# Patient Record
Sex: FEMALE | Race: WHITE | HISPANIC OR LATINO | Employment: FULL TIME | URBAN - METROPOLITAN AREA
[De-identification: names, ages, dates, MRNs, and addresses within clinical notes are randomized per-mention and may not be internally consistent; named-entity substitution may affect disease eponyms.]

---

## 2021-10-05 ENCOUNTER — OFFICE VISIT (OUTPATIENT)
Dept: OBGYN CLINIC | Facility: CLINIC | Age: 36
End: 2021-10-05

## 2021-10-05 VITALS
HEIGHT: 63 IN | HEART RATE: 80 BPM | WEIGHT: 147.6 LBS | SYSTOLIC BLOOD PRESSURE: 120 MMHG | BODY MASS INDEX: 26.15 KG/M2 | DIASTOLIC BLOOD PRESSURE: 81 MMHG

## 2021-10-05 DIAGNOSIS — N92.0 MENORRHAGIA WITH REGULAR CYCLE: ICD-10-CM

## 2021-10-05 DIAGNOSIS — Z12.4 CERVICAL CANCER SCREENING: ICD-10-CM

## 2021-10-05 DIAGNOSIS — Z12.39 ENCOUNTER FOR BREAST CANCER SCREENING USING NON-MAMMOGRAM MODALITY: ICD-10-CM

## 2021-10-05 DIAGNOSIS — Z01.419 WOMEN'S ANNUAL ROUTINE GYNECOLOGICAL EXAMINATION: Primary | ICD-10-CM

## 2021-10-05 PROCEDURE — G0145 SCR C/V CYTO,THINLAYER,RESCR: HCPCS | Performed by: NURSE PRACTITIONER

## 2021-10-05 PROCEDURE — G0476 HPV COMBO ASSAY CA SCREEN: HCPCS | Performed by: NURSE PRACTITIONER

## 2021-10-05 PROCEDURE — 99385 PREV VISIT NEW AGE 18-39: CPT | Performed by: NURSE PRACTITIONER

## 2021-10-05 PROCEDURE — 0503F POSTPARTUM CARE VISIT: CPT | Performed by: NURSE PRACTITIONER

## 2021-10-06 ENCOUNTER — HOSPITAL ENCOUNTER (OUTPATIENT)
Dept: ULTRASOUND IMAGING | Facility: HOSPITAL | Age: 36
Discharge: HOME | End: 2021-10-06
Payer: COMMERCIAL

## 2021-10-06 DIAGNOSIS — N92.0 MENORRHAGIA WITH REGULAR CYCLE: ICD-10-CM

## 2021-10-06 PROCEDURE — 76856 US EXAM PELVIC COMPLETE: CPT

## 2021-10-06 PROCEDURE — 76830 TRANSVAGINAL US NON-OB: CPT

## 2021-10-07 LAB
HPV HR 12 DNA CVX QL NAA+PROBE: NEGATIVE
HPV16 DNA CVX QL NAA+PROBE: NEGATIVE
HPV18 DNA CVX QL NAA+PROBE: NEGATIVE

## 2021-10-08 LAB
LAB AP GYN PRIMARY INTERPRETATION: NORMAL
Lab: NORMAL

## 2021-11-30 ENCOUNTER — HOSPITAL ENCOUNTER (EMERGENCY)
Facility: HOSPITAL | Age: 36
Discharge: HOME | End: 2021-11-30
Payer: COMMERCIAL

## 2021-11-30 ENCOUNTER — APPOINTMENT (EMERGENCY)
Dept: RADIOLOGY | Facility: HOSPITAL | Age: 36
End: 2021-11-30
Payer: COMMERCIAL

## 2021-11-30 VITALS
RESPIRATION RATE: 18 BRPM | HEIGHT: 62 IN | DIASTOLIC BLOOD PRESSURE: 71 MMHG | SYSTOLIC BLOOD PRESSURE: 114 MMHG | WEIGHT: 140 LBS | BODY MASS INDEX: 25.76 KG/M2 | TEMPERATURE: 97.6 F | OXYGEN SATURATION: 100 % | HEART RATE: 80 BPM

## 2021-11-30 DIAGNOSIS — S40.022A CONTUSION OF LEFT UPPER EXTREMITY, INITIAL ENCOUNTER: Primary | ICD-10-CM

## 2021-11-30 DIAGNOSIS — M25.569 KNEE PAIN: ICD-10-CM

## 2021-11-30 DIAGNOSIS — T07.XXXA ABRASIONS OF MULTIPLE SITES: ICD-10-CM
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
## 2021-11-30 PROCEDURE — 73130 X-RAY EXAM OF HAND: CPT

## 2021-11-30 PROCEDURE — 73060 X-RAY EXAM OF HUMERUS: CPT

## 2021-11-30 PROCEDURE — 99284 EMERGENCY DEPT VISIT MOD MDM: CPT

## 2021-11-30 PROCEDURE — 90715 TDAP VACCINE 7 YRS/> IM: CPT | Performed by: PHYSICIAN ASSISTANT

## 2021-11-30 PROCEDURE — 90471 IMMUNIZATION ADMIN: CPT

## 2021-11-30 PROCEDURE — 73090 X-RAY EXAM OF FOREARM: CPT

## 2021-11-30 PROCEDURE — 99284 EMERGENCY DEPT VISIT MOD MDM: CPT | Performed by: PHYSICIAN ASSISTANT

## 2021-11-30 RX ORDER — IBUPROFEN 600 MG/1
600 TABLET ORAL EVERY 6 HOURS PRN
Qty: 20 TABLET | Refills: 0 | Status: SHIPPED | OUTPATIENT
Start: 2021-11-30

## 2021-11-30 RX ORDER — IBUPROFEN 600 MG/1
600 TABLET ORAL ONCE
Status: COMPLETED | OUTPATIENT
Start: 2021-11-30 | End: 2021-11-30

## 2021-11-30 RX ADMIN — TETANUS TOXOID, REDUCED DIPHTHERIA TOXOID AND ACELLULAR PERTUSSIS VACCINE, ADSORBED 0.5 ML: 5; 2.5; 8; 8; 2.5 SUSPENSION INTRAMUSCULAR at 17:44

## 2021-11-30 RX ADMIN — IBUPROFEN 600 MG: 600 TABLET, FILM COATED ORAL at 17:43

## 2022-01-16 ENCOUNTER — NURSE TRIAGE (OUTPATIENT)
Dept: OTHER | Facility: OTHER | Age: 37
End: 2022-01-16

## 2022-01-16 DIAGNOSIS — Z11.59 SPECIAL SCREENING EXAMINATION FOR VIRAL DISEASE: Primary | ICD-10-CM

## 2022-01-16 PROCEDURE — U0003 INFECTIOUS AGENT DETECTION BY NUCLEIC ACID (DNA OR RNA); SEVERE ACUTE RESPIRATORY SYNDROME CORONAVIRUS 2 (SARS-COV-2) (CORONAVIRUS DISEASE [COVID-19]), AMPLIFIED PROBE TECHNIQUE, MAKING USE OF HIGH THROUGHPUT TECHNOLOGIES AS DESCRIBED BY CMS-2020-01-R: HCPCS | Performed by: FAMILY MEDICINE

## 2022-01-16 PROCEDURE — U0005 INFEC AGEN DETEC AMPLI PROBE: HCPCS | Performed by: FAMILY MEDICINE

## 2022-01-16 NOTE — TELEPHONE ENCOUNTER
Regarding: covid-symptomatic  ----- Message from [facility] sent at 1/16/2022  1:58 PM EST -----  "I would like to get tested  Elbert Neighbours been having symptoms "   1  Were you within 6 feet or less, for up to 15 minutes or more with a person that has a confirmed COVID-19 test? no  2  What was the date of your exposure? Unknown   3  Are you experiencing any symptoms attributed to the virus?  (Assess for SOB, cough, fever, difficulty breathing) body pain, headaches and coughing   4  HIGH RISK: Do you have any history heart or lung conditions, weakened immune system, diabetes, Asthma, CHF, HIV, COPD, Chemo, renal failure, sickle cell, etc? no  5  PREGNANCY: Are you pregnant or did you recently give birth? no  6   VACCINE: "Have you gotten the COVID-19 vaccine?" If Yes ask: "Which one, how many shots, when did you get it?" yes not sure

## 2022-01-16 NOTE — TELEPHONE ENCOUNTER
Reason for Disposition   [1] COVID-19 infection suspected by caller or triager AND [2] mild symptoms (cough, fever, or others) AND [3] has not gotten tested yet    Protocols used: CORONAVIRUS (COVID-19) [facility]AGNOSED [facility]PECTED[facility]

## 2022-01-17 ENCOUNTER — TELEPHONE (OUTPATIENT)
Dept: OTHER | Facility: OTHER | Age: 37
End: 2022-01-17

## 2022-01-17 NOTE — TELEPHONE ENCOUNTER
Your test for the novel coronavirus, also known as COVID-19, was positive  The sample showed that the virus was present  Positive COVID-19 test results are reportable to the [place] Department of Health  You may receive a call from trained public health staff to conduct an interview  It is important to answer their call  They will ask you to verify who you are  During the call they will ask you about what symptoms you have, what you did before you got sick, and who you were close to while sick  The health department does this to make sure everyone stays healthy and to reduce the spread of the virus  If you would like to verify if the caller does in fact work in contact tracing, call the [address] [place] [address] at [email or web address] ([phone])  For additional information, please visit the [facility]  website: [email or web address]     If you have any additional questions, we can schedule a virtual visit for you with a provider or call the [facility] hotline [phone], option 7, for care advice    For additional information, please visit the Coronavirus FAQ on the [facility] home page ([email or web address])

## 2023-01-03 ENCOUNTER — APPOINTMENT (EMERGENCY)
Dept: CT IMAGING | Facility: HOSPITAL | Age: 38
End: 2023-01-03

## 2023-01-03 ENCOUNTER — HOSPITAL ENCOUNTER (EMERGENCY)
Facility: HOSPITAL | Age: 38
Discharge: HOME | End: 2023-01-03
Attending: EMERGENCY MEDICINE

## 2023-01-03 ENCOUNTER — APPOINTMENT (EMERGENCY)
Dept: RADIOLOGY | Facility: HOSPITAL | Age: 38
End: 2023-01-03

## 2023-01-03 VITALS
DIASTOLIC BLOOD PRESSURE: 67 MMHG | TEMPERATURE: 98.2 F | HEART RATE: 68 BPM | OXYGEN SATURATION: 100 % | RESPIRATION RATE: 16 BRPM | SYSTOLIC BLOOD PRESSURE: 115 MMHG

## 2023-01-03 DIAGNOSIS — R07.89 CHEST DISCOMFORT: ICD-10-CM

## 2023-01-03 DIAGNOSIS — R51.9 HEADACHE: Primary | ICD-10-CM

## 2023-01-03 DIAGNOSIS — R06.02 SOB (SHORTNESS OF BREATH): ICD-10-CM

## 2023-01-03 DIAGNOSIS — R42 DIZZINESS: ICD-10-CM

## 2023-01-03 LAB
ALBUMIN SERPL BCP-MCNC: 4 G/DL (ref 3.5–5)
ALP SERPL-CCNC: 70 U/L (ref 34–104)
ALT SERPL W P-5'-P-CCNC: 20 U/L (ref 7–52)
ANION GAP SERPL CALCULATED.3IONS-SCNC: 4 MMOL/L (ref 4–13)
AST SERPL W P-5'-P-CCNC: 16 U/L (ref 13–39)
BASOPHILS # BLD AUTO: 0.04 THOUSANDS/ÂΜL (ref 0–0.1)
BASOPHILS NFR BLD AUTO: 1 % (ref 0–1)
BILIRUB SERPL-MCNC: 0.28 MG/DL (ref 0.2–1)
BUN SERPL-MCNC: 13 MG/DL (ref 5–25)
CALCIUM SERPL-MCNC: 9.3 MG/DL (ref 8.4–10.2)
CARDIAC TROPONIN I PNL SERPL HS: <2 NG/L
CHLORIDE SERPL-SCNC: 105 MMOL/L (ref 96–108)
CO2 SERPL-SCNC: 27 MMOL/L (ref 21–32)
CREAT SERPL-MCNC: 0.8 MG/DL (ref 0.6–1.3)
EOSINOPHIL # BLD AUTO: 0.13 THOUSAND/ÂΜL (ref 0–0.61)
EOSINOPHIL NFR BLD AUTO: 2 % (ref 0–6)
ERYTHROCYTE [DISTWIDTH] IN BLOOD BY AUTOMATED COUNT: 14.6 % (ref 11.6–15.1)
FLUAV RNA RESP QL NAA+PROBE: NEGATIVE
FLUBV RNA RESP QL NAA+PROBE: NEGATIVE
GFR SERPL CREATININE-BSD FRML MDRD: 93 ML/MIN/1.73SQ M
GLUCOSE SERPL-MCNC: 98 MG/DL (ref 65–140)
HCT VFR BLD AUTO: 35.8 % (ref 34.8–46.1)
HGB BLD-MCNC: 11.3 G/DL (ref 11.5–15.4)
IMM GRANULOCYTES # BLD AUTO: 0.03 THOUSAND/UL (ref 0–0.2)
IMM GRANULOCYTES NFR BLD AUTO: 1 % (ref 0–2)
LYMPHOCYTES # BLD AUTO: 2.78 THOUSANDS/ÂΜL (ref 0.6–4.47)
LYMPHOCYTES NFR BLD AUTO: 43 % (ref 14–44)
MCH RBC QN AUTO: 26.6 PG (ref 26.8–34.3)
MCHC RBC AUTO-ENTMCNC: 31.6 G/DL (ref 31.4–37.4)
MCV RBC AUTO: 84 FL (ref 82–98)
MONOCYTES # BLD AUTO: 0.44 THOUSAND/ÂΜL (ref 0.17–1.22)
MONOCYTES NFR BLD AUTO: 7 % (ref 4–12)
NEUTROPHILS # BLD AUTO: 3.02 THOUSANDS/ÂΜL (ref 1.85–7.62)
NEUTS SEG NFR BLD AUTO: 46 % (ref 43–75)
NRBC BLD AUTO-RTO: 0 /100 WBCS
PLATELET # BLD AUTO: 353 THOUSANDS/UL (ref 149–390)
PMV BLD AUTO: 9.4 FL (ref 8.9–12.7)
POTASSIUM SERPL-SCNC: 3.9 MMOL/L (ref 3.5–5.3)
PROT SERPL-MCNC: 7.1 G/DL (ref 6.4–8.4)
RBC # BLD AUTO: 4.25 MILLION/UL (ref 3.81–5.12)
RSV RNA RESP QL NAA+PROBE: NEGATIVE
SARS-COV-2 RNA RESP QL NAA+PROBE: NEGATIVE
SODIUM SERPL-SCNC: 136 MMOL/L (ref 135–147)
WBC # BLD AUTO: 6.44 THOUSAND/UL (ref 4.31–10.16)

## 2023-01-03 RX ORDER — MAGNESIUM SULFATE HEPTAHYDRATE 40 MG/ML
2 INJECTION, SOLUTION INTRAVENOUS ONCE
Status: COMPLETED | OUTPATIENT
Start: 2023-01-03 | End: 2023-01-03

## 2023-01-03 RX ORDER — DIPHENHYDRAMINE HYDROCHLORIDE 50 MG/ML
25 INJECTION INTRAMUSCULAR; INTRAVENOUS ONCE
Status: COMPLETED | OUTPATIENT
Start: 2023-01-03 | End: 2023-01-03

## 2023-01-03 RX ORDER — KETOROLAC TROMETHAMINE 30 MG/ML
15 INJECTION, SOLUTION INTRAMUSCULAR; INTRAVENOUS ONCE
Status: COMPLETED | OUTPATIENT
Start: 2023-01-03 | End: 2023-01-03

## 2023-01-03 RX ORDER — METOCLOPRAMIDE HYDROCHLORIDE 5 MG/ML
10 INJECTION INTRAMUSCULAR; INTRAVENOUS ONCE
Status: COMPLETED | OUTPATIENT
Start: 2023-01-03 | End: 2023-01-03

## 2023-01-03 RX ADMIN — SODIUM CHLORIDE 1000 ML: 0.9 INJECTION, SOLUTION INTRAVENOUS at 13:46

## 2023-01-03 RX ADMIN — METOCLOPRAMIDE 10 MG: 5 INJECTION, SOLUTION INTRAMUSCULAR; INTRAVENOUS at 13:47

## 2023-01-03 RX ADMIN — KETOROLAC TROMETHAMINE 15 MG: 30 INJECTION, SOLUTION INTRAMUSCULAR; INTRAVENOUS at 13:47

## 2023-01-03 RX ADMIN — DIPHENHYDRAMINE HYDROCHLORIDE 25 MG: 50 INJECTION, SOLUTION INTRAMUSCULAR; INTRAVENOUS at 13:46

## 2023-01-03 RX ADMIN — MAGNESIUM SULFATE HEPTAHYDRATE 2 G: 40 INJECTION, SOLUTION INTRAVENOUS at 14:42

## 2023-01-03 NOTE — ED ATTENDING ATTESTATION
1/3/2023  IDebbie DO, saw and evaluated the patient  I have discussed the patient with the resident/non-physician practitioner and agree with the resident's/non-physician practitioner's findings, Plan of Care, and MDM as documented in the resident's/non-physician practitioner's note, except where noted  All available labs and Radiology studies were reviewed  I was present for key portions of any procedure(s) performed by the resident/non-physician practitioner and I was immediately available to provide assistance  At this point I agree with the current assessment done in the Emergency Department  I have conducted an independent evaluation of this patient a history and physical is as follows:    15-year-old female coming into the ED for evaluation of a several day history of posterior headache, described as pressure, with some associated lightheadedness, chest tightness and shortness of breath  No sinus congestion  No fevers or chills  No leg swelling  No history of migraines  She states she does not get headaches like this frequently  PE:  The patient is well appearing, non-toxic, in NAD  Head: normocephalic, atraumatic  HEENT: mucous membranes moist   No sinus tenderness lungs: CTA b/l, no resp distress  Heart: RRR  No M/R/G  Abdomen: NT, ND, no R/R/G  Neuro: CN2-12 intact, GCS 15  Normal strength and sensation, normal speech and gait  Cap refill < 2 sec, skin warm and dry  No rashes or lesions  Workup and exam unremarkable  Symptoms resolved w/ migraine meds  Stable for d/c home, dx headache, migraine         ED Course         Critical Care Time  Procedures

## 2023-01-03 NOTE — DISCHARGE INSTRUCTIONS
Call and schedule follow-up appointment with your primary care doctor  You can take ibuprofen or Tylenol for any recurrence in your headache  Return to the emergency department should you have worsening or severe headache, worsening dizziness, worsening chest pain or shortness of breath/difficulty breathing, or any other concerning symptoms

## 2023-01-03 NOTE — ED PROVIDER NOTES
History  Chief Complaint   Patient presents with   • Dizziness     Pt c/o dizziness and an occipital headache for the past couple days, no hx of migraines  Pt describes the headache as pressure in the back of her head, but no further neuro deficits noted in triage  - n/v/d     Sangita Franco is a 42year old female with a history of migraines presenting for evaluation of several days of occipital headache, described as a pressure in the back of her head, with associated lightheadedness, chest pain and shortness of breath  Patient denies any cough or congestion, no known sick contacts  She says that the headache is different than previous headaches in location and sensation  She denies any neurological deficits at this time, gait is intact  She does not have any significant cardiopulmonary history, no history of DVT or PE, no recent surgeries, no active cancer, she does not take any oral estrogen, no recent lower extremity swelling or tenderness  Patient has not taken anything for her pain and discomfort  History provided by:  Patient   used: No    Dizziness  Associated symptoms: chest pain, headaches and shortness of breath    Associated symptoms: no palpitations and no vomiting        Prior to Admission Medications   Prescriptions Last Dose Informant Patient Reported? Taking?   ibuprofen (MOTRIN) 600 mg tablet   No No   Sig: Take 1 tablet (600 mg total) by mouth every 6 (six) hours as needed for mild pain      Facility-Administered Medications: None       Past Medical History:   Diagnosis Date   • Kidney stones        Past Surgical History:   Procedure Laterality Date   • TUBAL LIGATION         Family History   Problem Relation Age of Onset   • Breast cancer Paternal Aunt    • Colon cancer Neg Hx    • Ovarian cancer Neg Hx    • Diabetes Neg Hx      I have reviewed and agree with the history as documented      E-Cigarette/Vaping   • E-Cigarette Use Never User      E-Cigarette/Vaping Substances Social History     Tobacco Use   • Smoking status: Never   • Smokeless tobacco: Never   Vaping Use   • Vaping Use: Never used   Substance Use Topics   • Alcohol use: Never   • Drug use: Never        Review of Systems   Constitutional: Negative for chills and fever  HENT: Negative for ear pain and sore throat  Eyes: Negative for pain and visual disturbance  Respiratory: Positive for shortness of breath  Negative for cough  Cardiovascular: Positive for chest pain  Negative for palpitations  Gastrointestinal: Negative for abdominal pain and vomiting  Genitourinary: Negative for dysuria and hematuria  Musculoskeletal: Negative for arthralgias and back pain  Skin: Negative for color change and rash  Neurological: Positive for dizziness and headaches  Negative for seizures and syncope  All other systems reviewed and are negative  Physical Exam  ED Triage Vitals   Temperature Pulse Respirations Blood Pressure SpO2   01/03/23 1050 01/03/23 1050 01/03/23 1050 01/03/23 1050 01/03/23 1050   98 2 °F (36 8 °C) 69 18 135/66 99 %      Temp Source Heart Rate Source Patient Position - Orthostatic VS BP Location FiO2 (%)   01/03/23 1050 01/03/23 1050 01/03/23 1251 01/03/23 1050 --   Oral Monitor Sitting Right arm       Pain Score       01/03/23 1251       No Pain             Orthostatic Vital Signs  Vitals:    01/03/23 1251 01/03/23 1253 01/03/23 1445 01/03/23 1500   BP:  164/72 126/66 115/67   Pulse:  76 68 68   Patient Position - Orthostatic VS: Sitting Sitting Lying Lying       Physical Exam  Vitals and nursing note reviewed  Constitutional:       Appearance: She is ill-appearing  HENT:      Head: Normocephalic and atraumatic  Mouth/Throat:      Mouth: Mucous membranes are moist       Pharynx: Oropharynx is clear  Eyes:      General: No scleral icterus  Extraocular Movements: Extraocular movements intact        Conjunctiva/sclera: Conjunctivae normal       Pupils: Pupils are equal, round, and reactive to light  Cardiovascular:      Rate and Rhythm: Normal rate and regular rhythm  Heart sounds: Normal heart sounds  Pulmonary:      Effort: Pulmonary effort is normal  No respiratory distress  Breath sounds: Normal breath sounds  No wheezing, rhonchi or rales  Abdominal:      General: Abdomen is flat  There is no distension  Palpations: Abdomen is soft  Tenderness: There is no abdominal tenderness  Musculoskeletal:         General: No tenderness or signs of injury  Cervical back: Neck supple  No rigidity  Right lower leg: No edema  Left lower leg: No edema  Skin:     General: Skin is warm  Coloration: Skin is not jaundiced  Findings: No erythema or rash  Neurological:      General: No focal deficit present  Mental Status: She is alert and oriented to person, place, and time  Mental status is at baseline  GCS: GCS eye subscore is 4  GCS verbal subscore is 5  GCS motor subscore is 6  Cranial Nerves: No cranial nerve deficit  Sensory: No sensory deficit  Motor: No weakness  Coordination: Coordination normal  Finger-Nose-Finger Test and Heel to [facility] Test normal       Gait: Gait is intact     Psychiatric:         Mood and Affect: Mood normal          Behavior: Behavior normal          ED Medications  Medications   sodium chloride 0 9 % bolus 1,000 mL (1,000 mL Intravenous New Bag 1/3/23 1346)   diphenhydrAMINE (BENADRYL) injection 25 mg (25 mg Intravenous Given 1/3/23 1346)   metoclopramide (REGLAN) injection 10 mg (10 mg Intravenous Given 1/3/23 1347)   ketorolac (TORADOL) injection 15 mg (15 mg Intravenous Given 1/3/23 1347)   magnesium sulfate 2 g/50 mL IVPB (premix) 2 g (2 g Intravenous New Bag 1/3/23 1442)       Diagnostic Studies  Results Reviewed     Procedure Component Value Units Date/Time    FLU/RSV/COVID - if FLU/RSV clinically relevant [[id number]]  (Normal) Collected: 01/03/23    Lab Status: Final result Specimen: Nares from Nose Updated: 01/03/23 1505     SARS-CoV-2 Negative     INFLUENZA A PCR Negative     INFLUENZA B PCR Negative     RSV PCR Negative    Narrative:      FOR PEDIATRIC PATIENTS - copy/paste COVID Guidelines URL to browser: [email or web address]    SARS-CoV-2 assay is a Nucleic Acid Amplification assay intended for the  qualitative detection of nucleic acid from SARS-CoV-2 in nasopharyngeal  swabs  Results are for the presumptive identification of SARS-CoV-2 RNA  Positive results are indicative of infection with SARS-CoV-2, the virus  causing COVID-19, but do not rule out bacterial infection or co-infection  with other viruses  Laboratories within the [place] and its  territories are required to report all positive results to the appropriate  public health authorities  Negative results do not preclude SARS-CoV-2  infection and should not be used as the sole basis for treatment or other  patient management decisions  Negative results must be combined with  clinical observations, patient history, and epidemiological information  This test has not been FDA cleared or approved  This test has been authorized by FDA under an Emergency Use Authorization  (EUA)  This test is only authorized for the duration of time the  declaration that circumstances exist justifying the authorization of the  emergency use of an in vitro diagnostic tests for detection of SARS-CoV-2  virus and/or diagnosis of COVID-19 infection under section 564(b)(1) of  the Act, 21 U  S C  824NJB-6(V)(4), unless the authorization is terminated  or revoked sooner  The test has been validated but independent review by FDA  and CLIA is pending  Test performed using [employer] GeneXpert: This RT-PCR assay targets N2,  a region unique to SARS-CoV-2  A conserved region in the E-gene was chosen  for pan-Sarbecovirus detection which includes SARS-CoV-2      According to CMS-2020-01-R, this platform meets the definition of high-throughput technology      POCT pregnancy, urine [[id number]]     Lab Status: No result Specimen: Urine     HS Troponin 0hr (reflex protocol) [[id number]]  (Normal) Collected: 01/03/23 1057    Lab Status: Final result Specimen: Blood from Arm, Left Updated: 01/03/23 1144     hs TnI 0hr <2 ng/L     Comprehensive metabolic panel [[id number]] Collected: 01/03/23 1057    Lab Status: Final result Specimen: Blood from Arm, Left Updated: 01/03/23 1128     Sodium 136 mmol/L      Potassium 3 9 mmol/L      Chloride 105 mmol/L      CO2 27 mmol/L      ANION GAP 4 mmol/L      BUN 13 mg/dL      Creatinine 0 80 mg/dL      Glucose 98 mg/dL      Calcium 9 3 mg/dL      AST 16 U/L      ALT 20 U/L      Alkaline Phosphatase 70 U/L      Total Protein 7 1 g/dL      Albumin 4 0 g/dL      Total Bilirubin 0 28 mg/dL      eGFR 93 ml/min/1 73sq m     Narrative:      National Kidney Disease Foundation guidelines for Chronic Kidney Disease (CKD):   •  Stage 1 with normal or high GFR (GFR > 90 mL/min/1 73 square meters)  •  Stage 2 Mild CKD (GFR = 60-89 mL/min/1 73 square meters)  •  Stage 3A Moderate CKD (GFR = 45-59 mL/min/1 73 square meters)  •  Stage 3B Moderate CKD (GFR = 30-44 mL/min/1 73 square meters)  •  Stage 4 Severe CKD (GFR = 15-29 mL/min/1 73 square meters)  •  Stage 5 End Stage CKD (GFR <15 mL/min/1 73 square meters)  Note: GFR calculation is accurate only with a steady state creatinine    CBC and differential [[id number]]  (Abnormal) Collected: 01/03/23 1057    Lab Status: Final result Specimen: Blood from Arm, Left Updated: 01/03/23 1120     WBC 6 44 Thousand/uL      RBC 4 25 Million/uL      Hemoglobin 11 3 g/dL      Hematocrit 35 8 %      MCV 84 fL      MCH 26 6 pg      MCHC 31 6 g/dL      RDW 14 6 %      MPV 9 4 fL      Platelets 929 Thousands/uL      nRBC 0 /100 WBCs      Neutrophils Relative 46 %      Immat GRANS % 1 %      Lymphocytes Relative 43 %      Monocytes Relative 7 % Eosinophils Relative 2 %      Basophils Relative 1 %      Neutrophils Absolute 3 02 Thousands/µL      Immature Grans Absolute 0 03 Thousand/uL      Lymphocytes Absolute 2 78 Thousands/µL      Monocytes Absolute 0 44 Thousand/µL      Eosinophils Absolute 0 13 Thousand/µL      Basophils Absolute 0 04 Thousands/µL                  CT head without contrast   Final Result by Onelia Jamison MD (01/03 1459)      Normal examination                    Workstation performed: [id number]         XR chest 1 view portable   ED Interpretation by [id number],  (01/03 1529)   No acute cardiopulmonary abnormalities visualized            Procedures  Procedures      ED Course                     PERC Rule for PE    Flowsheet Row Most Recent Value   PERC Rule for PE    Age >=50 0 Filed at: 01/03/2023 1344   HR >=100 0 Filed at: 01/03/2023 1344   O2 Sat on room air < 95% 0 Filed at: 01/03/2023 1344   History of PE or DVT 0 Filed at: 01/03/2023 1344   Recent trauma or surgery 0 Filed at: 01/03/2023 1344   Hemoptysis 0 Filed at: 01/03/2023 1344   Exogenous estrogen 0 Filed at: 01/03/2023 1344   Unilateral leg swelling 0 Filed at: 01/03/2023 1344   PERC Rule for PE Results 0 Filed at: 01/03/2023 1344                  Wells' Criteria for PE    Flowsheet Row Most Recent Value   Wells' Criteria for PE    Clinical signs and symptoms of DVT 0 Filed at: 01/03/2023 1344   PE is primary diagnosis or equally likely 0 Filed at: 01/03/2023 1344   HR >100 0 Filed at: 01/03/2023 1344   Immobilization at least 3 days or Surgery in the previous 4 weeks 0 Filed at: 01/03/2023 1344   Previous, objectively diagnosed PE or DVT 0 Filed at: 01/03/2023 1344   Hemoptysis 0 Filed at: 01/03/2023 1344   Malignancy with treatment within 6 months or palliative 0 Filed at: 01/03/2023 1344   Wells' Criteria Total 0 Filed at: 01/03/2023 Emanuel Aguilera is a 42year old female with a history of migraines presenting for evaluation of several days of occipital headache, described as a pressure in the back of her head, with associated lightheadedness, chest pain and shortness of breath  Patient denied any neurological symptoms  Patient did not have any significant cardiac history, no history of DVT or PE, no risk factors as well for PE, PERC of 0  Had concern for possible tension headache, migraine headache, possible flu or COVID infection  Low suspicion for acute intracranial abnormality, however patient did describe that her headache is not like her typical migraine  Neurological exam was unremarkable  Rest of physiological exam is also unremarkable  CT of her head was negative for any acute intracranial abnormalities  Blood work was unremarkable and COVID/flu was negative  Patient was given a migraine cocktail with significant relief of her symptoms  I gave her strict return precautions and advised her to follow-up with her primary care doctor, she was agreeable to plan  Chest discomfort: acute illness or injury  Dizziness: acute illness or injury  Headache: acute illness or injury  SOB (shortness of breath): acute illness or injury  Amount and/or Complexity of Data Reviewed  Labs: ordered  Radiology: ordered and independent interpretation performed  Risk  Prescription drug management            Disposition  Final diagnoses:   Headache   Dizziness   Chest discomfort   SOB (shortness of breath)     Time reflects when diagnosis was documented in both MDM as applicable and the Disposition within this note     Time User Action Codes Description Comment    1/3/2023  3:37 PM Loy Fuelling [R51 9] Headache     1/3/2023  3:37 PM Juan Francisco Young Add [R42] Dizziness     1/3/2023  3:37 PM Juan Francisco Young Add [R07 89] Chest discomfort     1/3/2023  3:37 PM Juan Francisco Young Add [R06 02] SOB (shortness of breath)       ED Disposition     ED Disposition   Discharge    Condition   Stable    Date/Time Tue Michele 3, 2023  3:37 PM    [address] discharge to home/self care  Follow-up Information    None         Patient's Medications   Discharge Prescriptions    No medications on file     No discharge procedures on file  PDMP Review     None           ED Provider  Attending physically available and evaluated Annette Rehman I managed the patient along with the ED Attending      Electronically Signed by         Clement Agustin DO  01/03/23 8033

## 2023-03-09 ENCOUNTER — OFFICE VISIT (OUTPATIENT)
Dept: FAMILY MEDICINE CLINIC | Facility: CLINIC | Age: 38
End: 2023-03-09

## 2023-03-09 VITALS
OXYGEN SATURATION: 99 % | DIASTOLIC BLOOD PRESSURE: 76 MMHG | SYSTOLIC BLOOD PRESSURE: 112 MMHG | HEART RATE: 78 BPM | HEIGHT: 62 IN | TEMPERATURE: 98.3 F | WEIGHT: 134 LBS | BODY MASS INDEX: 24.66 KG/M2

## 2023-03-09 DIAGNOSIS — F33.0 MILD EPISODE OF RECURRENT MAJOR DEPRESSIVE DISORDER (HCC): ICD-10-CM

## 2023-03-09 DIAGNOSIS — R53.83 OTHER FATIGUE: Primary | ICD-10-CM

## 2023-03-09 RX ORDER — BUPROPION HYDROCHLORIDE 150 MG/1
150 TABLET ORAL EVERY MORNING
Qty: 30 TABLET | Refills: 5 | Status: SHIPPED | OUTPATIENT
Start: 2023-03-09 | End: 2023-09-05

## 2023-03-09 NOTE — PROGRESS NOTES
Michele Riley was seen today for establish care  Diagnoses and all orders for this visit:    Other fatigue  -     TSH, 3rd generation with Free T4 reflex; Future  -     CBC and Platelet; Future  -     Iron Panel (Includes Ferritin, Iron Sat%, Iron, and TIBC); Future  -     Vitamin D 25 hydroxy; Future  -     buPROPion (WELLBUTRIN XL) 150 mg 24 hr tablet; Take 1 tablet (150 mg total) by mouth every morning    Mild episode of recurrent major depressive disorder (HCC)  -     TSH, 3rd generation with Free T4 reflex; Future  -     Vitamin D 25 hydroxy; Future  -     buPROPion (WELLBUTRIN XL) 150 mg 24 hr tablet; Take 1 tablet (150 mg total) by mouth every morning        PMHx:  Nephrolithasis  History of depression  Eczema in childhood    Acute Concerns:  Dry skin on hands - duration of few months since winter began  Attempted lotion which helped somewhat  Legs and feet numb when sleeping - duration of few months  Feels restless during these times and feel need to move legs  Visual problem - Endorses blurry vision  Near objects are blurry when wearing glasses  Denies eye pain, eye drainage, tearing  Last saw optometrist last year  Does not wear contacts  Menorrhagia - Heavier menstrual cycles for few years duration with clots, possibly occurring after tubal ligation procedure  Last pap smear normal  No vaginal discharge or unusual odor  Medications:  None    Allergies:  Gluten    Hospitalizations:  ED visit 1/3/2023 for headaches - given migraine cocktail for symptomatic relief  Instructed to follow-up with PCP  Head CT negative for acute abnormalities    Surgeries:  Tubal ligation    Psych Hx:  Depression  Anxiety      Social Hx:  • Smokes? No  • Drinks? Yes  o What type of alcohol? Beer  o How many drinks per episode? One third of handle of liquor (over holidays)  o How many drinks per week? 0 on average  • Illicit drug use? 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 No  • Partners - men, women, or both? Men  • STD Hx: One prior episode during adolescence  Unsure of exact STI  • Contraceptive use: No         Menstrual Hx:  • Menarche: 11  • LMP: 2/13/2023  • Menstrual cycle length (28-32 days on average): monthly   • Bleeding days: 2-3 days          Family Hx:  • Cancer? Paternal grandmother - cervical cancer  Paternal aunt - breast cancer  • Stroke? No  • MI? No  • HTN? No  • HLD? No      ROS:  Review of Systems   Constitutional: Negative for chills and fever  HENT: Negative for sinus pain and sore throat  Respiratory: Positive for chest tightness  Negative for shortness of breath  Cardiovascular: Negative for chest pain  Gastrointestinal: Negative for abdominal pain, diarrhea and vomiting  Genitourinary: Negative for flank pain  Skin: Negative for rash  Neurological: Positive for numbness  Physical Exam  Vitals and nursing note reviewed  Constitutional:       General: She is not in acute distress  Appearance: She is well-developed  HENT:      Head: Normocephalic and atraumatic  Eyes:      Conjunctiva/sclera: Conjunctivae normal    Cardiovascular:      Rate and Rhythm: Normal rate and regular rhythm  Heart sounds: No murmur heard  Pulmonary:      Effort: Pulmonary effort is normal  No respiratory distress  Breath sounds: Normal breath sounds  Abdominal:      Palpations: Abdomen is soft  Tenderness: There is no abdominal tenderness  Musculoskeletal:         General: No swelling  Cervical back: Neck supple  Skin:     General: Skin is warm and dry  Capillary Refill: Capillary refill takes less than 2 seconds  Neurological:      Mental Status: She is alert     Psychiatric:         Mood and Affect: Mood normal          Labs reviewed: CMP, CBC & Diff  Imaging reviewed: Ct head w/o contrast, XR chest

## 2023-03-09 NOTE — PATIENT INSTRUCTIONS
[facility]  com for marriage counseling    La depresión   LO QUE NECESITA SABER:   La depresión es un trastorno médico que causa sentimientos de tristeza o desesperanza que no desaparecen  La depresión puede causar que usted pierda interés en las cosas que antes disfrutaba  Estos sentimientos pueden llegar a interferir con hayward beatris diaria  INSTRUCCIONES SOBRE EL [facility]ITAL[facility]:   Llame al [facility] [place] de emergencias local (911 en los [place]) si:  Kijet Wong en lastimarse o lastimar a alguien más  Usted ha hecho algo a propósito para hacerse daño  Llame a hayward terapeuta o médico si:  Dena síntomas no mejoran  No puede asistir a hayward próxima adela  Usted tiene [employer]  Usted tiene preguntas o inquietudes acerca de hayward condición o cuidado  Los siguientes recursos están disponibles en cualquier momento para ayudarlo, si es necesario:  Comuníquese con roberth organización de prevención del suicidio:       Para la 988 Suicide and Crisis Lifeline (línea de beatris 988 contra el suicidio y la crisis):     Llame o envíe un mensaje de texto al 988     Envíe un mensaje de chat en [email or web address] org/chat     Llame al [phone] ([phone])    Para la Suicide Hotline (línea de atención al suicida), llame al 2-729-956-805-505-8151 (8-930-EWCSVXM)    Para obtener roberth lista de números internacionales: https://save org/find-help/international-resources/  Medicamentos:  Antidepresivos pueden darse para mejorar o balancear hayward estado de ánimo  Es posible que usted necesite terri laurie medicamento por varias semanas antes de empezar a sentirse mejor  [place] dena medicamentos elizabeth se le haya indicado  Consulte con hayward médico si usted jasmin que hayward medicamento no le está ayudando o si presenta efectos secundarios  Infórmele al médico si usted es alérgico a algún medicamento  Mantenga roberth lista actualizada de los [place], las vitaminas y los productos herbales que mushtaq   Incluya los [employer] medicamentos: cantidad, frecuencia y motivo de administración  Traiga con usted la lista o los envases de las píldoras a alverto citas de seguimiento  Lleve la lista de los medicamentos con usted en mio de roberth emergencia  La terapia se utiliza a menudo junto con medicamentos para aliviar la depresión  La terapia es un lugar para hablar sobre alverto sentimientos y todo lo que puede estar causando la depresión  La terapia se puede realizar alicia o en jessica  También podría realizarse con alverto familiares o con hayward ann  Cuidados personales:  Realice actividad física regularmente  Trate de mantenerse activo por 30 minutos, de 3 a 5 días a la semana  La actividad física puede ayudar a aliviar la depresión  Colabore con hayward médico para crear un plan de ejercicios que usted disfrute  Puede ayudarlo si le pide a alguien que se mantenga activo con usted  Establezca un horario regular para dormir  Adriana Conn puede ayudarlo a relajarse antes de irse a dormir  Escuche música, ish o dago yoga  Trate de irse a dormir y despertarse al mismo tiempo todos los stephanie  El sueño es importante para la valentina Sharyn Ege saludables y variados  Los alimentos saludables incluyen frutas, verduras, panes integrales, productos lácteos descremados, santos magras, pescado y fríjoles  Un plan alimenticio saludable es bajo en grasas, sal y azúcar adicional     No tome alcohol ni use drogas  El alcohol y las drogas pueden empeorar la depresión  Hable con hayward terapeuta o médico si usted necesita ayuda para dejar de fumar  Acuda a alverto consultas de control con hayward médico según le indicaron: Hayward médico vigilará hayward progreso en las citas de seguimiento  También monitoreará hayward medicamento si usted está tomando antidepresivos  Hayward médico le preguntará si el [employer] está ayudando  Dígale sobre cualquier efecto secundario o problemas que usted tenga con hayward medicamento  Podría ser necesario cambiar el tipo o cantidad de [place]  Anote alverto preguntas para que se acuerde de hacerlas dariel alverto visitas  Para apoyo o más información:  [employer] on Mental Illness  [address] [facility]  , [address] [facility] , [address] [place] [address]  Phone: [phone]  Phone: [phone]  Web Address: http://Philoptima/  DATY  [address] [facility] Suicide and [address] [place] [id number]  Phone: [phone]  Web Address: PagosOnLine OR [email or web address]    © Copyright [employer] 2022 Information is for End User's use only and may not be sold, redistributed or otherwise used for commercial purposes  Esta información es sólo para uso en educación  Hayawrd intención no es darle un consejo médico sobre enfermedades o tratamientos  Colsulte con hayward Bautista Uma farmacéutico antes de seguir cualquier régimen médico para saber si es seguro y efectivo para usted

## 2023-04-25 ENCOUNTER — APPOINTMENT (OUTPATIENT)
Dept: LAB | Facility: CLINIC | Age: 38
End: 2023-04-25

## 2023-04-25 DIAGNOSIS — R53.83 OTHER FATIGUE: ICD-10-CM

## 2023-04-25 DIAGNOSIS — F33.0 MILD EPISODE OF RECURRENT MAJOR DEPRESSIVE DISORDER (HCC): ICD-10-CM

## 2023-04-25 LAB
25(OH)D3 SERPL-MCNC: 19.5 NG/ML (ref 30–100)
ERYTHROCYTE [DISTWIDTH] IN BLOOD BY AUTOMATED COUNT: 13.8 % (ref 11.6–15.1)
HCT VFR BLD AUTO: 37.7 % (ref 34.8–46.1)
HGB BLD-MCNC: 11.9 G/DL (ref 11.5–15.4)
MCH RBC QN AUTO: 27.7 PG (ref 26.8–34.3)
MCHC RBC AUTO-ENTMCNC: 31.6 G/DL (ref 31.4–37.4)
MCV RBC AUTO: 88 FL (ref 82–98)
PLATELET # BLD AUTO: 383 THOUSANDS/UL (ref 149–390)
PMV BLD AUTO: 9.9 FL (ref 8.9–12.7)
RBC # BLD AUTO: 4.3 MILLION/UL (ref 3.81–5.12)
WBC # BLD AUTO: 7.34 THOUSAND/UL (ref 4.31–10.16)

## 2023-04-26 LAB
FERRITIN SERPL-MCNC: 5 NG/ML (ref 8–388)
IRON SATN MFR SERPL: 11 % (ref 15–50)
IRON SERPL-MCNC: 42 UG/DL (ref 50–170)
TIBC SERPL-MCNC: 387 UG/DL (ref 250–450)
TSH SERPL DL<=0.05 MIU/L-ACNC: 1.67 UIU/ML (ref 0.45–4.5)

## 2023-05-08 ENCOUNTER — TELEPHONE (OUTPATIENT)
Dept: DERMATOLOGY | Facility: CLINIC | Age: 38
End: 2023-05-08

## 2023-05-08 NOTE — TELEPHONE ENCOUNTER
NP looking for apt for - extremely dry hands, our apts are end of nov for NP, pt declined apt as she has apt w/another provider at the same timeframe

## 2023-06-26 ENCOUNTER — TELEPHONE (OUTPATIENT)
Dept: OBGYN CLINIC | Facility: MEDICAL CENTER | Age: 38
End: 2023-06-26

## 2023-07-20 ENCOUNTER — OFFICE VISIT (OUTPATIENT)
Dept: FAMILY MEDICINE CLINIC | Facility: CLINIC | Age: 38
End: 2023-07-20
Payer: COMMERCIAL

## 2023-07-20 VITALS
OXYGEN SATURATION: 98 % | RESPIRATION RATE: 18 BRPM | DIASTOLIC BLOOD PRESSURE: 70 MMHG | WEIGHT: 145 LBS | TEMPERATURE: 98.3 F | SYSTOLIC BLOOD PRESSURE: 123 MMHG | HEIGHT: 62 IN | BODY MASS INDEX: 26.68 KG/M2 | HEART RATE: 79 BPM

## 2023-07-20 DIAGNOSIS — N94.10 DYSPAREUNIA IN FEMALE: Primary | ICD-10-CM

## 2023-07-20 LAB
BILIRUB UR QL STRIP: NEGATIVE
CLARITY UR: CLEAR
COLOR UR: COLORLESS
GLUCOSE UR STRIP-MCNC: NEGATIVE MG/DL
HGB UR QL STRIP.AUTO: NEGATIVE
KETONES UR STRIP-MCNC: NEGATIVE MG/DL
LEUKOCYTE ESTERASE UR QL STRIP: NEGATIVE
NITRITE UR QL STRIP: NEGATIVE
PH UR STRIP.AUTO: 7 [PH]
PROT UR STRIP-MCNC: NEGATIVE MG/DL
SP GR UR STRIP.AUTO: 1 (ref 1–1.03)
UROBILINOGEN UR STRIP-ACNC: <2 MG/DL

## 2023-07-20 PROCEDURE — 87480 CANDIDA DNA DIR PROBE: CPT | Performed by: FAMILY MEDICINE

## 2023-07-20 PROCEDURE — 87660 TRICHOMONAS VAGIN DIR PROBE: CPT | Performed by: FAMILY MEDICINE

## 2023-07-20 PROCEDURE — 87510 GARDNER VAG DNA DIR PROBE: CPT | Performed by: FAMILY MEDICINE

## 2023-07-20 PROCEDURE — 87591 N.GONORRHOEAE DNA AMP PROB: CPT | Performed by: FAMILY MEDICINE

## 2023-07-20 PROCEDURE — 99214 OFFICE O/P EST MOD 30 MIN: CPT | Performed by: FAMILY MEDICINE

## 2023-07-20 PROCEDURE — 87491 CHLMYD TRACH DNA AMP PROBE: CPT | Performed by: FAMILY MEDICINE

## 2023-07-20 PROCEDURE — 81003 URINALYSIS AUTO W/O SCOPE: CPT | Performed by: FAMILY MEDICINE

## 2023-07-20 NOTE — PROGRESS NOTES
Name: Conway Mcburney      : 1985      MRN: [id number]  Encounter Provider: Jonathan Tomas MD  Encounter Date: 2023   Encounter department: [facility]    Assessment & Plan     1. Dyspareunia in female  Assessment & Plan:  · Significant dyspareunia and chronic pelvic pain with menorrhagia   · Was following with OB/GYN previously, (+) fibroid on previous US  · No temporal association of pain with menses per patient report  · Will repeat ultrasound at this time to evaluate for fibroids/adenomyosis  · Based on patient's symptoms, high suspicion for endometriosis  · Referral given to OB/GYN for further evaluation  · Does not want to start any hormonal birth control at this time  · GC/chlamydia, AFFIRM, UA collected today to exclude infection, but low suspicion; will follow-up based on results  · UTD on Pap smear     Orders:  -     US pelvis complete non OB; Future; Expected date: 2023  -     Ambulatory Referral to Obstetrics / Gynecology; Future  -     UA w Reflex to Microscopic w Reflex to Culture - Clinic Collect  -     Chlamydia/GC amplified DNA by PCR; Future  -     VAGINOSIS DNA PROBE (AFFIRM); Future  -     Chlamydia/GC amplified DNA by PCR  -     VAGINOSIS DNA PROBE (AFFIRM)         Subjective      HPI     Patient here today regarding a chronic problem that she feels has recently gotten worse. She reports following her tubal ligation 8 years ago she began to develop intermittent pelvic pain, significant dyspareunia, and menorrhagia with regular cycles. She saw OB/GYN in 2020 who ordered pelvic ultrasound, US 10/5/2021 showed a subcentimeter anterior uterine fibroid and a resolving left ovarian cyst, and patient was advised that fibroid was likely the etiology of her heavy menses. She chose not to start any hormonal contraception at that time. Pap was normal at that time as well.  She has not followed back up with OB/GYN since then, but reports that she has continued to have this issue without much relief. Menses are currently every month, but very heavy with clots, she reports the duration has also become shorter (previously lasted 7 days, currently 2-3 days), unsure if her pelvic pain is worse around her periods. LMP was roughly 2 weeks ago. Reports significant pain with intercourse, both during intercourse with deep thrusting and after intercourse, feels pain is deep and central in her lower abdomen and pelvis, no radiation of pain, unable to truly characterize nature of pain just states that it "hurts" but is unsure whether it is sharp or dull specifically. Does report that severity is so bad she is unable to have intercourse with her partner. Was on OCP in the distant past but reports she stopped it due to concern that varicose veins were related to OCP; denies any history of DVT/VTE. Denies any vaginal discharge or dysuria but does feel "my vagina is swollen inside". Review of Systems   Constitutional: Negative for chills and fever. Respiratory: Negative for chest tightness and shortness of breath. Cardiovascular: Negative for chest pain and palpitations. Gastrointestinal: Negative for abdominal pain, diarrhea, nausea and vomiting. Genitourinary: Positive for dyspareunia, menstrual problem, pelvic pain and vaginal pain. Negative for difficulty urinating, dysuria, enuresis, flank pain, frequency, genital sores, hematuria, urgency and vaginal discharge. Skin: Negative for rash. Current Outpatient Medications on File Prior to Visit   Medication Sig   • buPROPion (WELLBUTRIN XL) 150 mg 24 hr tablet Take 1 tablet (150 mg total) by mouth every morning       Objective     /70 (BP Location: Right arm, Patient Position: Sitting, Cuff Size: Standard)   Pulse 79   Temp 98.3 °F (36.8 °C) (Tympanic)   Resp 18   Ht 5' 2" (1.575 m)   Wt 65.8 kg (145 lb)   SpO2 98%   BMI 26.52 kg/m²     Physical Exam  Vitals reviewed.    Constitutional: Appearance: Normal appearance. Cardiovascular:      Rate and Rhythm: Normal rate and regular rhythm. Pulmonary:      Effort: Pulmonary effort is normal.      Breath sounds: Normal breath sounds. Abdominal:      General: Abdomen is flat. Palpations: Abdomen is soft. Comments: Tenderness to the palpation in pelvis, L>R   Genitourinary:     General: Normal vulva. Comments: Cervix without any overt evidence of inflammation, pin sized nabothian cyst at 10 o'clock position, small amount of white discharge, appears physiologic; patient with significant discomfort throughout speculum exam  Musculoskeletal:         General: Normal range of motion. Right lower leg: No edema. Left lower leg: No edema. Neurological:      Mental Status: She is alert.        Collin Hernández MD

## 2023-07-20 NOTE — PATIENT INSTRUCTIONS
Endometriosis   AMBULATORY CARE:   Endometriosis  is a condition in which tissue that is normally only in your uterus grows outside of the uterus. Endometriosis causes tissue that should be shed during a monthly period to grow on your ovaries, fallopian tubes, bladder, or other organs. Organs and tissue may stick together and cause inflammation and pain. Common symptoms include the following:   Abdominal pain or nausea and vomiting before or during your period    Painful periods    Feeling full or bloated    Dizziness or fatigue    Heavy periods, or vaginal bleeding at times other than during your monthly period    Infertility (being unable to get pregnant)    Lower back pain or painful bowel movements during your monthly periods    Pain during or after sex    Pain when you urinate    Seek care immediately if:   You have severe pain that does not go away after you take pain medicine. Contact your healthcare provider if:   Your symptoms return after treatment. You have heavy or unusual vaginal bleeding. You see blood in your urine or bowel movement. You have questions or concerns about your condition or care. Medicines:   Hormones  may help shrink endometrial tissue and decrease pain and inflammation. You may be given birth control pills, androgen hormones, or medicine that makes your body produce less of certain hormones. Acetaminophen  decreases pain and is available without a doctor's order. Ask how much to take and how often to take it. Follow directions. Acetaminophen can cause liver damage if not taken correctly. NSAIDs , such as ibuprofen, help decrease swelling, pain, and fever. This medicine is available with or without a doctor's order. NSAIDs can cause stomach bleeding or kidney problems in certain people. If you take blood thinner medicine, always ask your healthcare provider if NSAIDs are safe for you. Always read the medicine label and follow directions.     Take your medicine as directed. Contact your healthcare provider if you think your medicine is not helping or if you have side effects. Tell your provider if you are allergic to any medicine. Keep a list of the medicines, vitamins, and herbs you take. Include the amounts, and when and why you take them. Bring the list or the pill bottles to follow-up visits. Carry your medicine list with you in case of an emergency. Self-care:   Apply heat  on your abdomen for 20 to 30 minutes every 2 hours for as many days as directed. Heat helps decrease pain and muscle spasms. Exercise regularly  to help reduce symptoms, such as pain. Ask about the best exercise plan for you. Follow up with your doctor as directed:  Write down your questions so you remember to ask them during your visits. © Copyright Layne Padgett 2022 Information is for End User's use only and may not be sold, redistributed or otherwise used for commercial purposes. The above information is an  only. It is not intended as medical advice for individual conditions or treatments. Talk to your doctor, nurse or pharmacist before following any medical regimen to see if it is safe and effective for you.

## 2023-07-20 NOTE — ASSESSMENT & PLAN NOTE
· Significant dyspareunia and chronic pelvic pain with menorrhagia   · Was following with OB/GYN previously, (+) fibroid on previous US  · No temporal association of pain with menses per patient report  · Will repeat ultrasound at this time to evaluate for fibroids/adenomyosis  · Based on patient's symptoms, high suspicion for endometriosis  · Referral given to OB/GYN for further evaluation  · Does not want to start any hormonal birth control at this time  · GC/chlamydia, AFFIRM, UA collected today to exclude infection, but low suspicion; will follow-up based on results  · UTD on Pap smear

## 2023-07-21 LAB
C TRACH DNA SPEC QL NAA+PROBE: NEGATIVE
CANDIDA RRNA VAG QL PROBE: NEGATIVE
G VAGINALIS RRNA GENITAL QL PROBE: NEGATIVE
N GONORRHOEA DNA SPEC QL NAA+PROBE: NEGATIVE
T VAGINALIS RRNA GENITAL QL PROBE: NEGATIVE

## 2023-12-28 ENCOUNTER — APPOINTMENT (EMERGENCY)
Dept: RADIOLOGY | Facility: HOSPITAL | Age: 38
End: 2023-12-28
Payer: COMMERCIAL

## 2023-12-28 ENCOUNTER — HOSPITAL ENCOUNTER (EMERGENCY)
Facility: HOSPITAL | Age: 38
Discharge: HOME | End: 2023-12-28
Attending: EMERGENCY MEDICINE
Payer: COMMERCIAL

## 2023-12-28 VITALS
TEMPERATURE: 98.3 F | HEART RATE: 80 BPM | SYSTOLIC BLOOD PRESSURE: 147 MMHG | DIASTOLIC BLOOD PRESSURE: 89 MMHG | OXYGEN SATURATION: 99 % | RESPIRATION RATE: 18 BRPM

## 2023-12-28 DIAGNOSIS — R06.02 SHORTNESS OF BREATH: ICD-10-CM

## 2023-12-28 DIAGNOSIS — U07.1 COVID-19: Primary | ICD-10-CM

## 2023-12-28 DIAGNOSIS — R53.1 GENERALIZED WEAKNESS: ICD-10-CM

## 2023-12-28 DIAGNOSIS — R05.9 COUGH: ICD-10-CM

## 2023-12-28 LAB
ANION GAP SERPL CALCULATED.3IONS-SCNC: 10 MMOL/L
BASOPHILS # BLD AUTO: 0.03 THOUSANDS/ÂΜL (ref 0–0.1)
BASOPHILS NFR BLD AUTO: 0 % (ref 0–1)
BUN SERPL-MCNC: 6 MG/DL (ref 5–25)
CALCIUM SERPL-MCNC: 9.4 MG/DL (ref 8.4–10.2)
CARDIAC TROPONIN I PNL SERPL HS: 2 NG/L
CHLORIDE SERPL-SCNC: 102 MMOL/L (ref 96–108)
CO2 SERPL-SCNC: 24 MMOL/L (ref 21–32)
CREAT SERPL-MCNC: 0.71 MG/DL (ref 0.6–1.3)
EOSINOPHIL # BLD AUTO: 0.08 THOUSAND/ÂΜL (ref 0–0.61)
EOSINOPHIL NFR BLD AUTO: 1 % (ref 0–6)
ERYTHROCYTE [DISTWIDTH] IN BLOOD BY AUTOMATED COUNT: 13.3 % (ref 11.6–15.1)
FLUAV RNA RESP QL NAA+PROBE: NEGATIVE
FLUBV RNA RESP QL NAA+PROBE: NEGATIVE
GFR SERPL CREATININE-BSD FRML MDRD: 106 ML/MIN/1.73SQ M
GLUCOSE SERPL-MCNC: 96 MG/DL (ref 65–140)
HCG SERPL QL: NEGATIVE
HCT VFR BLD AUTO: 37.7 % (ref 34.8–46.1)
HGB BLD-MCNC: 12.5 G/DL (ref 11.5–15.4)
IMM GRANULOCYTES # BLD AUTO: 0.01 THOUSAND/UL (ref 0–0.2)
IMM GRANULOCYTES NFR BLD AUTO: 0 % (ref 0–2)
LYMPHOCYTES # BLD AUTO: 3.15 THOUSANDS/ÂΜL (ref 0.6–4.47)
LYMPHOCYTES NFR BLD AUTO: 47 % (ref 14–44)
MAGNESIUM SERPL-MCNC: 1.9 MG/DL (ref 1.9–2.7)
MCH RBC QN AUTO: 27.7 PG (ref 26.8–34.3)
MCHC RBC AUTO-ENTMCNC: 33.2 G/DL (ref 31.4–37.4)
MCV RBC AUTO: 84 FL (ref 82–98)
MONOCYTES # BLD AUTO: 0.7 THOUSAND/ÂΜL (ref 0.17–1.22)
MONOCYTES NFR BLD AUTO: 10 % (ref 4–12)
NEUTROPHILS # BLD AUTO: 2.89 THOUSANDS/ÂΜL (ref 1.85–7.62)
NEUTS SEG NFR BLD AUTO: 42 % (ref 43–75)
NRBC BLD AUTO-RTO: 0 /100 WBCS
PLATELET # BLD AUTO: 335 THOUSANDS/UL (ref 149–390)
PMV BLD AUTO: 9.4 FL (ref 8.9–12.7)
POTASSIUM SERPL-SCNC: 3.5 MMOL/L (ref 3.5–5.3)
RBC # BLD AUTO: 4.51 MILLION/UL (ref 3.81–5.12)
RSV RNA RESP QL NAA+PROBE: NEGATIVE
SARS-COV-2 RNA RESP QL NAA+PROBE: POSITIVE
SODIUM SERPL-SCNC: 136 MMOL/L (ref 135–147)
WBC # BLD AUTO: 6.86 THOUSAND/UL (ref 4.31–10.16)

## 2023-12-28 PROCEDURE — 83735 ASSAY OF MAGNESIUM: CPT | Performed by: EMERGENCY MEDICINE

## 2023-12-28 PROCEDURE — 96361 HYDRATE IV INFUSION ADD-ON: CPT

## 2023-12-28 PROCEDURE — 84484 ASSAY OF TROPONIN QUANT: CPT | Performed by: EMERGENCY MEDICINE

## 2023-12-28 PROCEDURE — 80048 BASIC METABOLIC PNL TOTAL CA: CPT | Performed by: EMERGENCY MEDICINE

## 2023-12-28 PROCEDURE — 85025 COMPLETE CBC W/AUTO DIFF WBC: CPT | Performed by: EMERGENCY MEDICINE

## 2023-12-28 PROCEDURE — 0241U HB NFCT DS VIR RESP RNA 4 TRGT: CPT | Performed by: EMERGENCY MEDICINE

## 2023-12-28 PROCEDURE — 84703 CHORIONIC GONADOTROPIN ASSAY: CPT | Performed by: EMERGENCY MEDICINE

## 2023-12-28 PROCEDURE — 99285 EMERGENCY DEPT VISIT HI MDM: CPT

## 2023-12-28 PROCEDURE — 99285 EMERGENCY DEPT VISIT HI MDM: CPT | Performed by: EMERGENCY MEDICINE

## 2023-12-28 PROCEDURE — 36415 COLL VENOUS BLD VENIPUNCTURE: CPT | Performed by: EMERGENCY MEDICINE

## 2023-12-28 PROCEDURE — 93005 ELECTROCARDIOGRAM TRACING: CPT

## 2023-12-28 PROCEDURE — 96360 HYDRATION IV INFUSION INIT: CPT

## 2023-12-28 PROCEDURE — 71045 X-RAY EXAM CHEST 1 VIEW: CPT

## 2023-12-28 RX ORDER — ALBUTEROL SULFATE 90 UG/1
2 AEROSOL, METERED RESPIRATORY (INHALATION) EVERY 4 HOURS PRN
Qty: 6.7 G | Refills: 0 | Status: SHIPPED | OUTPATIENT
Start: 2023-12-28 | End: 2024-01-27

## 2023-12-28 RX ORDER — ACETAMINOPHEN 325 MG/1
975 TABLET ORAL ONCE
Status: COMPLETED | OUTPATIENT
Start: 2023-12-28 | End: 2023-12-28

## 2023-12-28 RX ADMIN — SODIUM CHLORIDE 1000 ML: 0.9 INJECTION, SOLUTION INTRAVENOUS at 20:26

## 2023-12-28 RX ADMIN — ACETAMINOPHEN 975 MG: 325 TABLET, FILM COATED ORAL at 20:30

## 2023-12-28 NOTE — Clinical Note
Alannah Joy was seen and treated in our emergency department on 12/28/2023.    No restrictions            Diagnosis:     Alannah  may return to work on return date.    She may return on this date: 12/30/2023    Must wear a mast at all times around people till 1/3/2024     If you have any questions or concerns, please don't hesitate to call.      Kassandra Mckeon MD    ______________________________           _______________          _______________  Hospital Representative                              Date                                Time

## 2023-12-29 LAB
ATRIAL RATE: 94 BPM
P AXIS: 86 DEGREES
PR INTERVAL: 124 MS
QRS AXIS: 74 DEGREES
QRSD INTERVAL: 78 MS
QT INTERVAL: 360 MS
QTC INTERVAL: 450 MS
T WAVE AXIS: 64 DEGREES
VENTRICULAR RATE: 94 BPM

## 2023-12-29 NOTE — ED NOTES
Discharge instructions reviewed with pt. Pt verbalized understanding. And has no further questions at this time. Pt ambulatory off unit with steady gait. Discharge instructions reviewed with pt. Pt verbalized understanding. And has no further questions at this time. Pt ambulatory off unit with steady gait.      Angela Alvarado RN  12/28/23 [id number]

## 2023-12-29 NOTE — ED PROVIDER NOTES
History  Chief Complaint   Patient presents with    Cold Exposure     Pt presents to the ed after not feeling well after 4 days, reports being weak and not wanting to do things around the house, no meds pta      38-year-old female with no pertinent past medical history who presents for evaluation of multiple symptoms.  Patient reports 4 days of generally not feeling well.  She complains of myalgias, fatigue, generalized weakness, shortness of breath.  She has had subjective fevers as well.  She has had headaches occasionally.  She denies any chest pain.  She has otherwise not had any abdominal pain, nausea, vomiting, diarrhea.  She has had exposure to sick contacts.  She was taking TheraFlu for her symptoms with minimal relief.        Prior to Admission Medications   Prescriptions Last Dose Informant Patient Reported? Taking?   buPROPion (WELLBUTRIN XL) 150 mg 24 hr tablet   No No   Sig: Take 1 tablet (150 mg total) by mouth every morning      Facility-Administered Medications: None       Past Medical History:   Diagnosis Date    Kidney stones        Past Surgical History:   Procedure Laterality Date    TUBAL LIGATION         Family History   Problem Relation Age of Onset    Breast cancer Paternal Aunt     Colon cancer Neg Hx     Ovarian cancer Neg Hx     Diabetes Neg Hx      I have reviewed and agree with the history as documented.    E-Cigarette/Vaping    E-Cigarette Use Never User      E-Cigarette/Vaping Substances     Social History     Tobacco Use    Smoking status: Never    Smokeless tobacco: Never   Vaping Use    Vaping status: Never Used   Substance Use Topics    Alcohol use: Never     Comment: ocassionally    Drug use: Never       Review of Systems   Constitutional:  Positive for fatigue and fever.   HENT:  Negative for congestion and sore throat.    Respiratory:  Positive for cough and shortness of breath.    Cardiovascular:  Negative for chest pain and leg swelling.   Gastrointestinal:  Negative for  abdominal pain, diarrhea, nausea and vomiting.   Genitourinary:  Negative for dysuria, flank pain and frequency.   Musculoskeletal:  Negative for gait problem.   Skin:  Negative for rash.   Neurological:  Positive for weakness. Negative for light-headedness.   All other systems reviewed and are negative.      Physical Exam  Physical Exam  Vitals and nursing note reviewed.   Constitutional:       General: She is not in acute distress.     Appearance: She is well-developed. She is not ill-appearing.   HENT:      Head: Normocephalic and atraumatic.      Nose: Nose normal.      Mouth/Throat:      Mouth: Mucous membranes are moist.      Pharynx: No oropharyngeal exudate or posterior oropharyngeal erythema.   Eyes:      Conjunctiva/sclera: Conjunctivae normal.   Cardiovascular:      Rate and Rhythm: Normal rate and regular rhythm.      Heart sounds: No murmur heard.     No friction rub. No gallop.   Pulmonary:      Effort: Pulmonary effort is normal.      Breath sounds: Normal breath sounds. No wheezing, rhonchi or rales.   Abdominal:      General: There is no distension.      Palpations: Abdomen is soft.      Tenderness: There is no abdominal tenderness.   Musculoskeletal:         General: No swelling or tenderness. Normal range of motion.      Cervical back: Normal range of motion and neck supple.   Skin:     General: Skin is warm and dry.      Coloration: Skin is not pale.      Findings: No rash.   Neurological:      General: No focal deficit present.      Mental Status: She is alert and oriented to person, place, and time.      Cranial Nerves: No cranial nerve deficit.      Sensory: No sensory deficit.      Motor: No weakness.   Psychiatric:         Behavior: Behavior normal.         Vital Signs  ED Triage Vitals   Temperature Pulse Respirations Blood Pressure SpO2   12/28/23 1958 12/28/23 1958 12/28/23 1958 12/28/23 1959 12/28/23 1958   98.3 °F (36.8 °C) 80 18 147/89 99 %      Temp Source Heart Rate Source Patient  Position - Orthostatic VS BP Location FiO2 (%)   12/28/23 1958 12/28/23 1958 12/28/23 1959 12/28/23 1959 --   Oral Monitor Sitting Right arm       Pain Score       --                  Vitals:    12/28/23 1958 12/28/23 1959   BP:  147/89   Pulse: 80    Patient Position - Orthostatic VS:  Sitting         Visual Acuity      ED Medications  Medications   acetaminophen (TYLENOL) tablet 975 mg (975 mg Oral Given 12/28/23 2030)   sodium chloride 0.9 % bolus 1,000 mL (0 mL Intravenous Stopped 12/28/23 2209)       Diagnostic Studies  Results Reviewed       Procedure Component Value Units Date/Time    FLU/RSV/COVID - if FLU/RSV clinically relevant [[id number]]  (Abnormal) Collected: 12/28/23 2015    Lab Status: Final result Specimen: Nares from Nose Updated: 12/28/23 2113     SARS-CoV-2 Positive     INFLUENZA A PCR Negative     INFLUENZA B PCR Negative     RSV PCR Negative    Narrative:      FOR PEDIATRIC PATIENTS - copy/paste COVID Guidelines URL to browser: [email or web address]    SARS-CoV-2 assay is a Nucleic Acid Amplification assay intended for the  qualitative detection of nucleic acid from SARS-CoV-2 in nasopharyngeal  swabs. Results are for the presumptive identification of SARS-CoV-2 RNA.    Positive results are indicative of infection with SARS-CoV-2, the virus  causing COVID-19, but do not rule out bacterial infection or co-infection  with other viruses. Laboratories within the [place] and its  territories are required to report all positive results to the appropriate  public health authorities. Negative results do not preclude SARS-CoV-2  infection and should not be used as the sole basis for treatment or other  patient management decisions. Negative results must be combined with  clinical observations, patient history, and epidemiological information.  This test has not been FDA cleared or approved.    This test has been authorized by FDA under an  Emergency Use Authorization  (EUA). This test is only authorized for the duration of time the  declaration that circumstances exist justifying the authorization of the  emergency use of an in vitro diagnostic tests for detection of SARS-CoV-2  virus and/or diagnosis of COVID-19 infection under section 564(b)(1) of  the Act, 21 U.S.C. 360bbb-3(b)(1), unless the authorization is terminated  or revoked sooner. The test has been validated but independent review by FDA  and CLIA is pending.    Test performed using [employer] GeneXpert: This RT-PCR assay targets N2,  a region unique to SARS-CoV-2. A conserved region in the E-gene was chosen  for pan-Sarbecovirus detection which includes SARS-CoV-2.    According to CMS-2020-01-R, this platform meets the definition of high-throughput technology.    hCG, qualitative pregnancy [[id number]]  (Normal) Collected: 12/28/23 2015    Lab Status: Final result Specimen: Blood from Arm, Left Updated: 12/28/23 2059     Preg, Serum Negative    HS Troponin 0hr (reflex protocol) [[id number]]  (Normal) Collected: 12/28/23 2015    Lab Status: Final result Specimen: Blood from Arm, Left Updated: 12/28/23 2058     hs TnI 0hr 2 ng/L     Basic metabolic panel [[id number]] Collected: 12/28/23 2015    Lab Status: Final result Specimen: Blood from Arm, Left Updated: 12/28/23 2052     Sodium 136 mmol/L      Potassium 3.5 mmol/L      Chloride 102 mmol/L      CO2 24 mmol/L      ANION GAP 10 mmol/L      BUN 6 mg/dL      Creatinine 0.71 mg/dL      Glucose 96 mg/dL      Calcium 9.4 mg/dL      eGFR 106 ml/min/1.73sq m     Narrative:      National Kidney Disease Foundation guidelines for Chronic Kidney Disease (CKD):     Stage 1 with normal or high GFR (GFR > 90 mL/min/1.73 square meters)    Stage 2 Mild CKD (GFR = 60-89 mL/min/1.73 square meters)    Stage 3A Moderate CKD (GFR = 45-59 mL/min/1.73 square meters)    Stage 3B Moderate CKD (GFR = 30-44 mL/min/1.73 square meters)    Stage 4 Severe CKD (GFR = 15-29  mL/min/1.73 square meters)    Stage 5 End Stage CKD (GFR <15 mL/min/1.73 square meters)  Note: GFR calculation is accurate only with a steady state creatinine    Magnesium [[id number]]  (Normal) Collected: 12/28/23 2015    Lab Status: Final result Specimen: Blood from Arm, Left Updated: 12/28/23 2052     Magnesium 1.9 mg/dL     CBC and differential [[id number]]  (Abnormal) Collected: 12/28/23 2015    Lab Status: Final result Specimen: Blood from Arm, Left Updated: 12/28/23 2035     WBC 6.86 Thousand/uL      RBC 4.51 Million/uL      Hemoglobin 12.5 g/dL      Hematocrit 37.7 %      MCV 84 fL      MCH 27.7 pg      MCHC 33.2 g/dL      RDW 13.3 %      MPV 9.4 fL      Platelets 335 Thousands/uL      nRBC 0 /100 WBCs      Neutrophils Relative 42 %      Immat GRANS % 0 %      Lymphocytes Relative 47 %      Monocytes Relative 10 %      Eosinophils Relative 1 %      Basophils Relative 0 %      Neutrophils Absolute 2.89 Thousands/µL      Immature Grans Absolute 0.01 Thousand/uL      Lymphocytes Absolute 3.15 Thousands/µL      Monocytes Absolute 0.70 Thousand/µL      Eosinophils Absolute 0.08 Thousand/µL      Basophils Absolute 0.03 Thousands/µL                    XR chest 1 view portable   ED Interpretation by Kassandra Mckeon MD (12/28 2100)   No infiltrate or pneumothorax.  Independently interpreted by me.                 Procedures  Procedures         ED Course  ED Course as of 12/29/23 0036   u Dec 28, 2023   2037 CBC and differential(!)   2045 Procedure Note: EKG  Date/Time: 12/28/23 8:41 PM   Interpreted by: Kassandra Mckeon  Indications / Diagnosis: shortness of breath  ECG reviewed by me, the ED Provider: yes   The EKG demonstrates:  Rhythm: rate 94, normal sinus  Intervals: normal intervals  Axis: normal axis  QRS/Blocks: normal QRS  ST Changes: No acute ST Changes, no STD/EJ.    2053 Basic metabolic panel   2053 Magnesium: 1.9   2058 hs TnI 0hr: 2   2059 PREGNANCY, SERUM: Negative   2115 FLU/RSV/COVID - if FLU/RSV  clinically relevant(!)                               SBIRT 22yo+      Flowsheet Row Most Recent Value   Initial Alcohol Screen: US AUDIT-C     1. How often do you have a drink containing alcohol? 0 Filed at: 12/28/2023 1959   2. How many drinks containing alcohol do you have on a typical day you are drinking?  0 Filed at: 12/28/2023 1959   3a. Male UNDER 65: How often do you have five or more drinks on one occasion? 0 Filed at: 12/28/2023 1959   3b. FEMALE Any Age, or MALE 65+: How often do you have 4 or more drinks on one occassion? 0 Filed at: 12/28/2023 1959   Audit-C Score 0 Filed at: 12/28/2023 1959   ISABELLE: How many times in the past year have you...    Used an illegal drug or used a prescription medication for non-medical reasons? Never Filed at: 12/28/2023 1959                      Medical Decision Making  38-year-old female presenting for evaluation of generalized weakness, fatigue, shortness of breath, feeling unwell.  Vital signs stable on arrival.  Patient with an overall benign examination.  Differential diagnoses include but not limited to viral infection, electrolyte abnormality, dehydration, pregnancy, arrhythmia, ACS, pneumonia.  Labs overall unremarkable.  Viral panel positive for COVID-19 accounting for patient's symptoms.  Chest x-ray unremarkable.  Patient stable for discharge otherwise.  Advised follow-up with primary care physician.  Return precautions discussed.    Problems Addressed:  Cough: acute illness or injury  COVID-19: acute illness or injury  Generalized weakness: acute illness or injury  Shortness of breath: acute illness or injury    Amount and/or Complexity of Data Reviewed  Labs: ordered. Decision-making details documented in ED Course.  Radiology: ordered and independent interpretation performed.  ECG/medicine tests: ordered and independent interpretation performed. Decision-making details documented in ED Course.    Risk  OTC drugs.  Prescription drug management.              Disposition  Final diagnoses:   COVID-19   Generalized weakness   Shortness of breath   Cough     Time reflects when diagnosis was documented in both MDM as applicable and the Disposition within this note       Time User Action Codes Description Comment    12/28/2023  9:51 PM Kassandra Mckeon [U07.1] COVID-19     12/28/2023  9:51 PM Kassandra cMkeon [R53.1] Generalized weakness     12/28/2023  9:51 PM Kassandra Mckeon [R06.02] Shortness of breath     12/28/2023  9:51 PM Kassandra Mckeon [R05.9] Cough           ED Disposition       ED Disposition   Discharge    Condition   Stable    Date/Time   Thu Dec 28, 2023 2151    Comment   Alannah Fishera discharge to home/self care.                   Follow-up Information    None         Discharge Medication List as of 12/28/2023  9:52 PM        START taking these medications    Details   albuterol (ProAir HFA) 90 mcg/act inhaler Inhale 2 puffs every 4 (four) hours as needed for shortness of breath, Starting Thu 12/28/2023, Until Sat 1/27/2024 at 2359, Normal           CONTINUE these medications which have NOT CHANGED    Details   buPROPion (WELLBUTRIN XL) 150 mg 24 hr tablet Take 1 tablet (150 mg total) by mouth every morning, Starting Thu 3/9/2023, Until Tue 9/5/2023, Normal             No discharge procedures on file.    PDMP Review       None            ED Provider  Electronically Signed by             Kassandra Mckeon MD  12/29/23 0037

## 2023-12-29 NOTE — DISCHARGE INSTRUCTIONS
Follow-up with your primary care physician.  You can continue taking Tylenol and Motrin every 6 hours as needed for pain or fevers.  Use the prescribed inhaler as directed.  Please return to the emergency department if you develop worsening symptoms, difficulty breathing, or anything else concerning to you.  The current guidelines recommend that you quarantine for 5 days from symptom onset followed by 5 days of masking.

## 2024-01-23 ENCOUNTER — OFFICE VISIT (OUTPATIENT)
Dept: FAMILY MEDICINE CLINIC | Facility: CLINIC | Age: 39
End: 2024-01-23
Payer: COMMERCIAL

## 2024-01-23 VITALS
BODY MASS INDEX: 26.61 KG/M2 | HEIGHT: 62 IN | SYSTOLIC BLOOD PRESSURE: 113 MMHG | TEMPERATURE: 98.1 F | DIASTOLIC BLOOD PRESSURE: 70 MMHG | HEART RATE: 74 BPM | WEIGHT: 144.6 LBS | RESPIRATION RATE: 18 BRPM | OXYGEN SATURATION: 100 %

## 2024-01-23 DIAGNOSIS — K59.04 CHRONIC IDIOPATHIC CONSTIPATION: ICD-10-CM

## 2024-01-23 DIAGNOSIS — Z12.31 ENCOUNTER FOR SCREENING MAMMOGRAM FOR MALIGNANT NEOPLASM OF BREAST: ICD-10-CM

## 2024-01-23 DIAGNOSIS — Z11.4 SCREENING FOR HIV (HUMAN IMMUNODEFICIENCY VIRUS): ICD-10-CM

## 2024-01-23 DIAGNOSIS — Z11.59 NEED FOR HEPATITIS C SCREENING TEST: ICD-10-CM

## 2024-01-23 DIAGNOSIS — Z00.00 ANNUAL PHYSICAL EXAM: Primary | ICD-10-CM

## 2024-01-23 DIAGNOSIS — R51.9 INTRACTABLE EPISODIC HEADACHE, UNSPECIFIED HEADACHE TYPE: ICD-10-CM

## 2024-01-23 DIAGNOSIS — H53.8 BLURRY VISION, BILATERAL: ICD-10-CM

## 2024-01-23 DIAGNOSIS — Z13.220 SCREENING FOR CHOLESTEROL LEVEL: ICD-10-CM

## 2024-01-23 DIAGNOSIS — R53.83 OTHER FATIGUE: ICD-10-CM

## 2024-01-23 PROCEDURE — 99213 OFFICE O/P EST LOW 20 MIN: CPT

## 2024-01-23 PROCEDURE — 99396 PREV VISIT EST AGE 40-64: CPT

## 2024-01-23 RX ORDER — FERROUS SULFATE 324(65)MG
324 TABLET, DELAYED RELEASE (ENTERIC COATED) ORAL
Qty: 120 TABLET | Refills: 0 | Status: SHIPPED | OUTPATIENT
Start: 2024-01-23

## 2024-01-23 RX ORDER — POLYETHYLENE GLYCOL 3350 17 G/17G
17 POWDER, FOR SOLUTION ORAL DAILY
Qty: 20 EACH | Refills: 0 | Status: SHIPPED | OUTPATIENT
Start: 2024-01-23

## 2024-01-23 RX ORDER — ERGOCALCIFEROL 1.25 MG/1
50000 CAPSULE ORAL WEEKLY
Qty: 8 CAPSULE | Refills: 0 | Status: SHIPPED | OUTPATIENT
Start: 2024-01-23 | End: 2024-03-13

## 2024-01-23 NOTE — PROGRESS NOTES
ADULT ANNUAL PHYSICAL  [facility]    NAME: Alannah Joy  AGE: 38 y.o. SEX: female  : 1985     DATE: 2024     Assessment and Plan:     Problem List Items Addressed This Visit       Chronic idiopathic constipation     Miralax ordered at this time    Counseled on sufficient PO hydration, fiber intake         Relevant Medications    polyethylene glycol (MIRALAX) 17 g packet    Blurry vision, bilateral    Relevant Medications    ferrous sulfate 324 (65 Fe) mg    Other Relevant Orders    Ambulatory Referral to Neurology    Iron Panel (Includes Ferritin, Iron Sat%, Iron, and TIBC) (Completed)    Intractable episodic headache     Described as painless pressure in the top of her head that comes and goes on its own.         Relevant Orders    Ambulatory Referral to Neurology    Other fatigue     May be due to insufficient sleep (poor sleep hygiene), known iron deficiency, and/or Vitamin D deficiency; however will evaluate for other causes. See orders. Will replete the above and recheck in 2 months.         Relevant Medications    ferrous sulfate 324 (65 Fe) mg    ergocalciferol (VITAMIN D2) 50,000 units    Other Relevant Orders    Vitamin D 25 hydroxy (Completed)    Iron Panel (Includes Ferritin, Iron Sat%, Iron, and TIBC) (Completed)     Other Visit Diagnoses       Annual physical exam    -  Primary    Encounter for screening mammogram for malignant neoplasm of breast        Relevant Orders    Mammo screening bilateral w 3d & cad    Screening for cholesterol level        Relevant Orders    Lipid panel (Completed)    Screening for HIV (human immunodeficiency virus)        Relevant Orders    HIV 1/2 AG/AB w Reflex [facility] for 2 yr old and above (Completed)    Need for hepatitis C screening test        Relevant Orders    Hepatitis C antibody (Completed)            Immunizations and preventive care screenings were discussed with patient today. Appropriate  education was printed on patient's after visit summary.    Counseling:  Alcohol/drug use: discussed moderation in alcohol intake, the recommendations for healthy alcohol use, and avoidance of illicit drug use.  Dental Health: discussed importance of regular tooth brushing, flossing, and dental visits.  Injury prevention: discussed safety/seat belts, safety helmets, smoke detectors, carbon dioxide detectors, and smoking near bedding or upholstery.  Sexual health: discussed sexually transmitted diseases, partner selection, use of condoms, avoidance of unintended pregnancy, and contraceptive alternatives.  Exercise: the importance of regular exercise/physical activity was discussed. Recommend exercise 3-5 times per week for at least 30 minutes.          Return in about 2 months (around 3/23/2024) for Recheck Iron Panel, Vitamin D level.     Chief Complaint:     Chief Complaint   Patient presents with    Physical Exam     Pressure on her head for the past a year ago but it's getting worst for the past 1 to 2 weeks, buried vision at times, nausea and dizzy at times       History of Present Illness:     Adult Annual Physical   Patient here for a comprehensive physical exam. The patient reports problems - every day, pain in the head in the morning; it has been going on for a year, occurring more frequently in the last few weeks. Pressure in the center in her head. She notices it when she wakes up, it does not wake her up. Vision is blurry for a brief moment after waking but normalizes a few seconds later. In the past evaluated by the ER, did not find anything abnormal. Pressure is present throughout the day but not as intense.  Denies N/V. Unsure of photophobia or phonophobia. Feels generally weak, no focal deficits. Has not tried taking any medications. Denies tinnitus or changes in hearing .    Diet and Physical Activity  Diet/Nutrition: limited fruits/vegetables and meat, beans, rice, coffee; Drinks one large bottle of  water per day .   Exercise: moderate cardiovascular exercise, 3-4 times a week on average, and nany/dancing .   Occasional alcohol use, no tobacco use or recreational drug use     Depression Screening  PHQ-2/9 Depression Screening           General Health  Sleep: gets 4-6 hours of sleep on average and feels restful; occasional poor sleeping; been told she snores in the past .   Hearing:  reports no change .  Vision: goes for regular eye exams, most recent eye exam <1 year ago, and wears glasses.   Dental: regular dental visits, no dental visits for >1 year, brushes teeth twice daily, and flosses teeth occasionally.       /GYN Health  Follows with gynecology? yes   Patient is: premenopausal  Last menstrual period: 2 weeks ago; has been irregular after tubal ligation  Contraceptive method:  tubal ligation .    Advanced Care Planning  Do you have an advanced directive? no  Do you have a durable medical power of ? no     Review of Systems:     Review of Systems   Constitutional:  Negative for appetite change and fever.   HENT:  Negative for dental problem and sore throat.    Eyes:  Positive for visual disturbance. Negative for photophobia.   Respiratory:  Negative for chest tightness and shortness of breath.    Cardiovascular:  Negative for chest pain and leg swelling.   Gastrointestinal:  Positive for constipation. Negative for nausea.        BM once per day; has to strain and push to go; occasional BRBPR   Genitourinary:  Negative for difficulty urinating and dysuria.   Neurological:  Negative for dizziness and numbness.   Psychiatric/Behavioral:  Negative for dysphoric mood and hallucinations.       Past Medical History:     Past Medical History:   Diagnosis Date    Kidney stones       Past Surgical History:     Past Surgical History:   Procedure Laterality Date    TUBAL LIGATION        Social History:     Social History     Socioeconomic History    Marital status: /Civil Union     Spouse name: None     Number of children: 4    Years of education: None    Highest education level: None   Occupational History    None   Tobacco Use    Smoking status: Never    Smokeless tobacco: Never   Vaping Use    Vaping status: Never Used   Substance and Sexual Activity    Alcohol use: Yes     Comment: ocassionally    Drug use: Never    Sexual activity: Yes     Partners: Male     Birth control/protection: Female Sterilization   Other Topics Concern    None   Social History Narrative    None     Social Determinants of Health     Financial Resource Strain: Low Risk  (10/5/2021)    Overall Financial Resource Strain (CARDIA)     Difficulty of Paying Living Expenses: Not hard at all   Food Insecurity: No Food Insecurity (10/5/2021)    Hunger Vital Sign     Worried About Running Out of Food in the Last Year: Never true     Ran Out of Food in the Last Year: Never true   Transportation Needs: No Transportation Needs (10/5/2021)    PRAPARE - Transportation     Lack of Transportation (Medical): No     Lack of Transportation (Non-Medical): No   Physical Activity: Not on file   Stress: No Stress Concern Present (10/5/2021)    [employer] - Occupational Stress Questionnaire     Feeling of Stress : Not at all   Social Connections: Not on file   Intimate Partner Violence: Not At Risk (10/5/2021)    Humiliation, Afraid, Rape, and Kick questionnaire     Fear of Current or Ex-Partner: No     Emotionally Abused: No     Physically Abused: No     Sexually Abused: No   Housing Stability: Low Risk  (10/5/2021)    Housing Stability Vital Sign     Unable to Pay for Housing in the Last Year: No     Number of Places Lived in the Last Year: 1     Unstable Housing in the Last Year: No      Family History:     Family History   Problem Relation Age of Onset    Breast cancer Paternal Aunt     Colon cancer Neg Hx     Ovarian cancer Neg Hx     Diabetes Neg Hx       Current Medications:     Current Outpatient Medications   Medication  "Sig Dispense Refill    ergocalciferol (VITAMIN D2) 50,000 units Take 1 capsule (50,000 Units total) by mouth once a week for 8 doses 8 capsule 0    ferrous sulfate 324 (65 Fe) mg Take 1 tablet (324 mg total) by mouth 2 (two) times a day before meals 120 tablet 0    polyethylene glycol (MIRALAX) 17 g packet Take 17 g by mouth daily 20 each 0    buPROPion (WELLBUTRIN XL) 150 mg 24 hr tablet Take 1 tablet (150 mg total) by mouth every morning 30 tablet 5     No current facility-administered medications for this visit.      Allergies:     No Known Allergies   Physical Exam:     /70 (BP Location: Right arm, Patient Position: Sitting, Cuff Size: Standard)   Pulse 74   Temp 98.1 °F (36.7 °C) (Tympanic)   Resp 18   Ht 5' 2\" (1.575 m)   Wt 65.6 kg (144 lb 9.6 oz)   SpO2 100%   BMI 26.45 kg/m²     Physical Exam  Constitutional:       General: She is not in acute distress.     Appearance: Normal appearance.   HENT:      Head: Normocephalic and atraumatic.   Cardiovascular:      Rate and Rhythm: Normal rate and regular rhythm.      Pulses: Normal pulses.   Pulmonary:      Effort: Pulmonary effort is normal.      Breath sounds: Normal breath sounds.   Abdominal:      General: Abdomen is flat.      Palpations: Abdomen is soft.      Tenderness: There is no abdominal tenderness.   Musculoskeletal:      Cervical back: Normal range of motion.   Skin:     General: Skin is warm and dry.   Neurological:      General: No focal deficit present.      Mental Status: She is alert and oriented to person, place, and time. Mental status is at baseline.   Psychiatric:         Mood and Affect: Mood normal.         Thought Content: Thought content normal.          Catherine Price MD  [facility]    "

## 2024-01-23 NOTE — PATIENT INSTRUCTIONS
Constipation   WHAT YOU NEED TO KNOW:   Constipation means you have hard, dry bowel movements, or you go longer than usual between bowel movements.  DISCHARGE INSTRUCTIONS:   Call your doctor if:   You have blood in your bowel movements.    You have a fever and abdominal pain with the constipation.    Your constipation gets worse.    You start to vomit.    You have questions or concerns about your condition or care.    Medicines:   Medicine  such as a laxative may help relax and loosen your intestines to help you have a bowel movement. Your provider may recommend you only use laxatives for a short time. Long-term use can damage your bowel function over time.    Take your medicine as directed.  Contact your healthcare provider if you think your medicine is not helping or if you have side effects. Tell your provider if you are allergic to any medicine. Keep a list of the medicines, vitamins, and herbs you take. Include the amounts, and when and why you take them. Bring the list or the pill bottles to follow-up visits. Carry your medicine list with you in case of an emergency.    Relieve constipation:   A suppository  may be used to help soften your bowel movements. This may make them easier to pass. A suppository is guided into your rectum through your anus.         An enema  is liquid medicine used to clear bowel movement from your rectum. The medicine is put into your rectum through your anus.       Prevent constipation:   Drink liquids as directed.  You may need to drink extra liquids to help soften and move your bowels. Ask how much liquid to drink each day and which liquids are best for you.    Eat high-fiber foods.  This may help decrease constipation by adding bulk to your bowel movements. High-fiber foods include fruit, vegetables, whole-grain breads and cereals, and beans. Your healthcare provider or dietitian can help you create a high-fiber meal plan. Your provider may also recommend a fiber supplement if  you cannot get enough fiber from food.         Exercise regularly.  Regular physical activity can help stimulate your intestines. Walking is a good exercise to prevent or relieve constipation. Ask which exercises are best for you.         Schedule a time each day to have a bowel movement.  This may help train your body to have regular bowel movements. Bend forward while you are on the toilet to help move the bowel movement out. Sit on the toilet for at least 10 minutes, even if you do not have a bowel movement.    Talk to your healthcare provider about your medicines.  Certain medicines, such as opioids, can cause constipation. Your provider may be able to make medicine changes. For example, he or she may change the kind of medicine, or change when you take it.    Follow up with your doctor as directed:  Write down your questions so you remember to ask them during your visits.  © Copyright [employer] 2023 Information is for End User's use only and may not be sold, redistributed or otherwise used for commercial purposes.  The above information is an  only. It is not intended as medical advice for individual conditions or treatments. Talk to your doctor, nurse or pharmacist before following any medical regimen to see if it is safe and effective for you.    General Headache   WHAT YOU NEED TO KNOW:   Headache pain may be mild or severe. Common causes include stress, medicines, and head injuries. Sleep problems, allergies, and hormone changes can also cause a headache. You may have frequent headaches that have no clear cause. Pain may start in another part of your body and move to your head. Headache pain can also move to other parts of your body. A headache can cause other symptoms, such as nausea and vomiting. A severe headache may be a sign of a stroke or other serious problem that needs immediate treatment.  DISCHARGE INSTRUCTIONS:   Have someone call your local emergency number (918 in the [place]) for any  of the following:   You have any of the following signs of a stroke:      Numbness or drooping on one side of your face     Weakness in an arm or leg    Confusion or difficulty speaking    Dizziness, a severe headache, or vision loss         Return to the emergency department if:   You have a headache with neck stiffness and a fever.    You have a constant headache and are vomiting.    You have severe pain that does not get better after you take pain medicine.    You have a headache and the pain worsens when you look into light.    You have a headache and vision changes, such as blurred vision.    You have a headache and are forgetful or confused.    Call your doctor if:   You have a headache each day that does not get better, even after treatment.    You have changes in your headaches, or new symptoms that occur when you have a headache.    Others you live or work with also have headaches.    You have questions or concerns about your condition or care.    Medicines:  You may need any of the following:  Medicines  may be given to prevent or treat headache pain. Do not wait until the pain is severe to take your medicine. Ask your healthcare provider how to take the medicine safely.    NSAIDs , such as ibuprofen, help decrease swelling, pain, and fever. This medicine is available with or without a doctor's order. NSAIDs can cause stomach bleeding or kidney problems in certain people. If you take blood thinner medicine, always ask if NSAIDs are safe for you. Always read the medicine label and follow directions. Do not give these medicines to children younger than 6 months without direction from a healthcare provider.     Acetaminophen  decreases pain and fever. It is available without a doctor's order. Ask how much to take and how often to take it. Follow directions. Read the labels of all other medicines you are using to see if they also contain acetaminophen, or ask your doctor or pharmacist. Acetaminophen can cause  liver damage if not taken correctly.    Antinausea medicine  may be given to calm your stomach and help prevent vomiting.    Take your medicine as directed.  Contact your healthcare provider if you think your medicine is not helping or if you have side effects. Tell your provider if you are allergic to any medicine. Keep a list of the medicines, vitamins, and herbs you take. Include the amounts, and when and why you take them. Bring the list or the pill bottles to follow-up visits. Carry your medicine list with you in case of an emergency.    Manage your symptoms:   Rest in a dark and quiet room.  This may help decrease your pain.    Apply heat or ice as directed.  Heat or ice may help decrease pain or muscle spasms. Apply heat or ice on the area for 20 minutes every 2 hours for as many days as directed. Your healthcare provider may recommend that you alternate heat and ice.    Relax your muscles to help relieve a headache.  Lie down in a comfortable position and close your eyes. Relax your muscles slowly. Start at your toes and work your way up your body. A massage or warm bath may also help relax your muscles.    Keep a headache record:  Record the dates and times that you get headaches, and what you were doing before the headache started. Also record what you ate and drank in the 24 hours before the headache started. This might help your healthcare provider find the cause of your headaches and make a treatment plan. The record can also help you avoid headache triggers or manage your symptoms.  Get enough sleep:  You should get 8 to 10 hours of sleep each night. Create a sleep schedule. Go to bed and wake up at the same times each day. It may be helpful to do something relaxing before bed. Do not watch television right before bed.  Do not smoke:  Nicotine and other chemicals in cigarettes and cigars can trigger a headache or make it worse. Ask your healthcare provider for information if you currently smoke and  need help to quit. E-cigarettes or smokeless tobacco still contain nicotine. Talk to your healthcare provider before you use these products.  Drink liquids as directed:  You may need to drink more liquid to prevent dehydration. Dehydration can cause a headache. Ask your healthcare provider how much liquid to drink each day and which liquids are best for you.  Limit caffeine and alcohol as directed:  Your headaches may be triggered by caffeine or alcohol. You may also develop a headache if you drink caffeine regularly and suddenly stop.  Eat a variety of healthy foods:  Do not skip meals. Too little food can trigger a headache. Include fruits, vegetables, whole-grain breads, low-fat dairy products, beans, lean meat, and fish. Do not have trigger foods, such as chocolate and red wine. Foods that contain gluten, nitrates, MSG, or artificial sweeteners may also trigger a headache.       Follow up with your doctor as directed:  Write down your questions so you remember to ask them during your visits.  © Copyright [employer] 2023 Information is for End User's use only and may not be sold, redistributed or otherwise used for commercial purposes.  The above information is an  only. It is not intended as medical advice for individual conditions or treatments. Talk to your doctor, nurse or pharmacist before following any medical regimen to see if it is safe and effective for you.

## 2024-01-26 ENCOUNTER — APPOINTMENT (OUTPATIENT)
Dept: LAB | Facility: CLINIC | Age: 39
End: 2024-01-26
Payer: COMMERCIAL

## 2024-01-26 DIAGNOSIS — Z13.220 SCREENING FOR CHOLESTEROL LEVEL: ICD-10-CM

## 2024-01-26 DIAGNOSIS — Z11.59 NEED FOR HEPATITIS C SCREENING TEST: ICD-10-CM

## 2024-01-26 DIAGNOSIS — H53.8 BLURRY VISION, BILATERAL: ICD-10-CM

## 2024-01-26 DIAGNOSIS — R53.83 OTHER FATIGUE: ICD-10-CM

## 2024-01-26 DIAGNOSIS — Z11.4 SCREENING FOR HIV (HUMAN IMMUNODEFICIENCY VIRUS): ICD-10-CM

## 2024-01-26 LAB
25(OH)D3 SERPL-MCNC: 27 NG/ML (ref 30–100)
CHOLEST SERPL-MCNC: 198 MG/DL
FERRITIN SERPL-MCNC: 8 NG/ML (ref 11–307)
HDLC SERPL-MCNC: 68 MG/DL
IRON SATN MFR SERPL: 24 % (ref 15–50)
IRON SERPL-MCNC: 103 UG/DL (ref 50–212)
LDLC SERPL CALC-MCNC: 110 MG/DL (ref 0–100)
NONHDLC SERPL-MCNC: 130 MG/DL
TIBC SERPL-MCNC: 425 UG/DL (ref 250–450)
TRIGL SERPL-MCNC: 99 MG/DL
UIBC SERPL-MCNC: 322 UG/DL (ref 155–355)

## 2024-01-26 PROCEDURE — 86803 HEPATITIS C AB TEST: CPT

## 2024-01-26 PROCEDURE — 82728 ASSAY OF FERRITIN: CPT

## 2024-01-26 PROCEDURE — 36415 COLL VENOUS BLD VENIPUNCTURE: CPT

## 2024-01-26 PROCEDURE — 82306 VITAMIN D 25 HYDROXY: CPT

## 2024-01-26 PROCEDURE — 87389 HIV-1 AG W/HIV-1&-2 AB AG IA: CPT

## 2024-01-26 PROCEDURE — 83540 ASSAY OF IRON: CPT

## 2024-01-26 PROCEDURE — 83550 IRON BINDING TEST: CPT

## 2024-01-26 PROCEDURE — 80061 LIPID PANEL: CPT

## 2024-01-27 LAB
HCV AB SER QL: NORMAL
HIV 1+2 AB+HIV1 P24 AG SERPL QL IA: NORMAL
HIV 2 AB SERPL QL IA: NORMAL
HIV1 AB SERPL QL IA: NORMAL
HIV1 P24 AG SERPL QL IA: NORMAL

## 2024-01-30 PROBLEM — R53.83 OTHER FATIGUE: Status: ACTIVE | Noted: 2024-01-30

## 2024-01-30 PROBLEM — K59.04 CHRONIC IDIOPATHIC CONSTIPATION: Status: ACTIVE | Noted: 2024-01-30

## 2024-01-30 PROBLEM — H53.8 BLURRY VISION, BILATERAL: Status: ACTIVE | Noted: 2024-01-30

## 2024-01-30 PROBLEM — R51.9 INTRACTABLE EPISODIC HEADACHE: Status: ACTIVE | Noted: 2024-01-30

## 2024-01-30 NOTE — ASSESSMENT & PLAN NOTE
May be due to insufficient sleep (poor sleep hygiene), known iron deficiency, and/or Vitamin D deficiency; however will evaluate for other causes. See orders. Will replete the above and recheck in 2 months.

## 2024-02-01 ENCOUNTER — TELEPHONE (OUTPATIENT)
Dept: OBGYN CLINIC | Facility: CLINIC | Age: 39
End: 2024-02-01

## 2024-02-01 NOTE — TELEPHONE ENCOUNTER
Pt yumiko, wanted to schedule an appointment asap. Is concerned for her health. She has had her period 2x this month and it is heavy.

## 2024-02-09 ENCOUNTER — OFFICE VISIT (OUTPATIENT)
Dept: OBGYN CLINIC | Facility: CLINIC | Age: 39
End: 2024-02-09
Payer: COMMERCIAL

## 2024-02-09 VITALS
BODY MASS INDEX: 27.2 KG/M2 | SYSTOLIC BLOOD PRESSURE: 124 MMHG | WEIGHT: 147.8 LBS | HEIGHT: 62 IN | DIASTOLIC BLOOD PRESSURE: 86 MMHG

## 2024-02-09 DIAGNOSIS — N92.6 IRREGULAR MENSES: Primary | ICD-10-CM

## 2024-02-09 DIAGNOSIS — N93.0 BLEEDING AFTER INTERCOURSE: ICD-10-CM

## 2024-02-09 DIAGNOSIS — K62.5 RECTAL BLEED: ICD-10-CM

## 2024-02-09 PROCEDURE — 99214 OFFICE O/P EST MOD 30 MIN: CPT | Performed by: PHYSICIAN ASSISTANT

## 2024-02-09 PROCEDURE — 87660 TRICHOMONAS VAGIN DIR PROBE: CPT | Performed by: PHYSICIAN ASSISTANT

## 2024-02-09 PROCEDURE — 87491 CHLMYD TRACH DNA AMP PROBE: CPT | Performed by: PHYSICIAN ASSISTANT

## 2024-02-09 PROCEDURE — 87480 CANDIDA DNA DIR PROBE: CPT | Performed by: PHYSICIAN ASSISTANT

## 2024-02-09 PROCEDURE — 87510 GARDNER VAG DNA DIR PROBE: CPT | Performed by: PHYSICIAN ASSISTANT

## 2024-02-09 PROCEDURE — 87591 N.GONORRHOEAE DNA AMP PROB: CPT | Performed by: PHYSICIAN ASSISTANT

## 2024-02-09 NOTE — PATIENT INSTRUCTIONS
Go for blood work and pelvic U/S.    Call GI for appointment.    Consider pelvic floor PT.    Call if symptoms worsen or change.

## 2024-02-09 NOTE — PROGRESS NOTES
Assessment/Plan:    No problem-specific Assessment & Plan notes found for this encounter.       Diagnoses and all orders for this visit:    Irregular menses  -     US pelvis complete w transvaginal; Future  -     TSH, 3rd generation; Future  -     T4, free; Future    Bleeding after intercourse  -     Chlamydia/GC amplified DNA by PCR  -     Vaginosis DNA Probe    Rectal bleed  -     Ambulatory Referral to Gastroenterology; Future        GC/chlamydia screening and vaginal cultures done.  Orders for pelvic U/S and thyroid labs entered.  We will call with all results.  Referral to GI for rectal bleeding entered; patient to call for appointment.  Recommended patient to start tracking her cycles in a period to see if any patterns develop.  Could try pelvic floor PT for pain with intercourse; she will consider.  F/u to depend on results.  Call if symptoms worsen or change.    Subjective:      Patient ID: Alannah Joy is a 38 y.o. female.    Patient is a  here with multiple complaints.  Her last visit was 2021.  Patient states she had two periods in January - one that started  and another that started .  Her period in December started 12/10.  Has not been keeping track of cycles prior to December.  Bleeding lasted for 2-7 days.  She denies heavy bleeding and severe cramping.  Has a history of a small uterine fibroid.  She is s/p tubal sterilization; feels that periods were never the same after her procedure.  Patient is sexually active with a monogamous partner.  Has been having frequent bleeding and pelvic pain with intercourse.  Notes an occasional odor.  Pap in  was normal. Has a history of chronic constipation; has had some bleeding with bowel movements recently.  Denies bladder changes, urine leakage, pelvic pain, bloating, abdominal pain, n/v, change in appetite, and thyroid disease.        The following portions of the patient's history were reviewed and updated as appropriate:  "allergies, current medications, past family history, past medical history, past social history, past surgical history, and problem list.    Review of Systems   Constitutional:  Negative for appetite change and unexpected weight change.   Gastrointestinal:  Positive for blood in stool and constipation. Negative for abdominal distention, abdominal pain, diarrhea, nausea and vomiting.   Genitourinary:  Positive for dyspareunia and menstrual problem (Irregular menses). Negative for difficulty urinating, dysuria, frequency, genital sores, hematuria, pelvic pain, urgency, vaginal bleeding, vaginal discharge and vaginal pain.         Objective:      /86 (BP Location: Left arm, Patient Position: Sitting, Cuff Size: Adult)   Ht 5' 2\" (1.575 m)   Wt 67 kg (147 lb 12.8 oz)   LMP 01/29/2024 (Exact Date)   BMI 27.03 kg/m²          Physical Exam  Vitals reviewed. Exam conducted with a chaperone present.   Constitutional:       Appearance: Normal appearance. She is well-developed and normal weight.   Genitourinary:     General: Normal vulva.      Pubic Area: No rash.       Labia:         Right: No rash, tenderness, lesion or injury.         Left: No rash, tenderness, lesion or injury.       Vagina: Normal. No vaginal discharge, erythema, tenderness or bleeding.      Cervix: Normal.      Uterus: Normal.       Adnexa: Right adnexa normal and left adnexa normal.        Right: No mass, tenderness or fullness.          Left: No mass, tenderness or fullness.     Lymphadenopathy:      Lower Body: No right inguinal adenopathy. No left inguinal adenopathy.   Skin:     General: Skin is warm and dry.   Neurological:      Mental Status: She is alert and oriented to person, place, and time.   Psychiatric:         Mood and Affect: Mood normal.         Behavior: Behavior normal. Behavior is cooperative.         Thought Content: Thought content normal.         Judgment: Judgment normal.           "

## 2024-02-10 LAB
C TRACH DNA SPEC QL NAA+PROBE: NEGATIVE
CANDIDA RRNA VAG QL PROBE: NOT DETECTED
G VAGINALIS RRNA GENITAL QL PROBE: DETECTED
N GONORRHOEA DNA SPEC QL NAA+PROBE: NEGATIVE
T VAGINALIS RRNA GENITAL QL PROBE: NOT DETECTED

## 2024-02-14 DIAGNOSIS — B96.89 BV (BACTERIAL VAGINOSIS): Primary | ICD-10-CM

## 2024-02-14 DIAGNOSIS — N76.0 BV (BACTERIAL VAGINOSIS): Primary | ICD-10-CM

## 2024-02-14 RX ORDER — METRONIDAZOLE 500 MG/1
500 TABLET ORAL EVERY 12 HOURS SCHEDULED
Qty: 14 TABLET | Refills: 0 | Status: SHIPPED | OUTPATIENT
Start: 2024-02-14 | End: 2024-02-15 | Stop reason: SDUPTHER

## 2024-02-15 DIAGNOSIS — B96.89 BV (BACTERIAL VAGINOSIS): ICD-10-CM

## 2024-02-15 DIAGNOSIS — N76.0 BV (BACTERIAL VAGINOSIS): ICD-10-CM

## 2024-02-15 RX ORDER — METRONIDAZOLE 500 MG/1
500 TABLET ORAL EVERY 12 HOURS SCHEDULED
Qty: 14 TABLET | Refills: 0 | Status: SHIPPED | OUTPATIENT
Start: 2024-02-15 | End: 2024-02-22

## 2024-02-17 ENCOUNTER — HOSPITAL ENCOUNTER (OUTPATIENT)
Dept: ULTRASOUND IMAGING | Facility: HOSPITAL | Age: 39
Discharge: HOME | End: 2024-02-17
Payer: COMMERCIAL

## 2024-02-17 DIAGNOSIS — N92.6 IRREGULAR MENSES: ICD-10-CM

## 2024-02-17 PROCEDURE — 76856 US EXAM PELVIC COMPLETE: CPT

## 2024-02-17 PROCEDURE — 76830 TRANSVAGINAL US NON-OB: CPT

## 2024-02-22 ENCOUNTER — TELEPHONE (OUTPATIENT)
Dept: OBGYN CLINIC | Facility: CLINIC | Age: 39
End: 2024-02-22

## 2024-02-22 NOTE — TELEPHONE ENCOUNTER
Please call patient with normal pelvic ultrasound results.  Make sure she has started to track her menstrual cycles.  Follow up for yearly exam as planned.

## 2024-03-04 ENCOUNTER — TELEPHONE (OUTPATIENT)
Dept: NEUROLOGY | Facility: CLINIC | Age: 39
End: 2024-03-04

## 2024-03-04 NOTE — TELEPHONE ENCOUNTER
Spoke with patient and scheduled an appointment with Dr. Lunsford for 3/7 at [facility]. Patient asked for a text message be sent with appointment details.

## 2024-03-06 NOTE — PROGRESS NOTES
Patient ID: Alannah Joy is a 38 y.o. female.    Assessment/Plan:    Intractable episodic headache  37 yo F with head pressure, b/l blurry vision, fatigue is here for the initial evaluation of head pressure.    Patient has a combination of migrainous features and tension headache. Will try lifestyle changes for now since patient does not like to take medications     Plan:  -Discussed plan with neurology attending, Dr. Scruggs. Please refer to the attestation for additional recommendation and plan  -Recommend lifestyle changes as below  -Can trial Magnesium 400mg daily and Riboflavin (Vitamin B2) 400mg daily  -Drink water 60-80 oz per day  -limit coffee intake to no more than 2 cups  -take Decadron 2mg daily for 5 days to break the cycle    Follow up in about 3-4 months       Diagnoses and all orders for this visit:    Pressure in head  -     Discontinue: dexamethasone (DECADRON) 2 mg tablet; Take 1 tablet (2 mg total) by mouth in the morning for 5 days  -     dexamethasone (DECADRON) 2 mg tablet; Take 1 tablet (2 mg total) by mouth in the morning for 5 days    Blurry vision, bilateral  -     Ambulatory Referral to Neurology    Intractable episodic headache, unspecified headache type  -     Ambulatory Referral to Neurology  -     Discontinue: dexamethasone (DECADRON) 2 mg tablet; Take 1 tablet (2 mg total) by mouth in the morning for 5 days  -     dexamethasone (DECADRON) 2 mg tablet; Take 1 tablet (2 mg total) by mouth in the morning for 5 days         Subjective:    37 yo F with head pressure, b/l blurry vision, fatigue is here for the initial evaluation of head pressure.    Head pressure started in Jan 2023. She drank a whole bottle of liquor the day before (new year dayana) and she regarded as hang over but the head pressure continues until now. She is not being treated for it. She relaxes in a dark room. The light sometimes bothers her. Sometimes nausea. No vomiting. She has been having head pressure almost every  day and some days are worse and some days are better but no head pressure free days for the past 3-4 months. Sometimes it is associated with blurry vision and dizziness in her head. Baseline head pressure is 1-2/10, when it is bad, it is 8-9/10. No balance issue/falls. No neck pain. No shiny lights in her eyes.     She remembers that when she was young, she had headache with exposure to sunlight and described as 'big bang' not the same as current one.    She drinks 24 oz of water and 3 cups of coffee a day  She has 8 hrs of sleep per night    1/3/2023 CTH: unremarkable    The following portions of the patient's history were reviewed and updated as appropriate: allergies, current medications, past family history, past medical history, past social history, past surgical history, and problem list.         Objective:    Blood pressure 96/60, pulse 76, weight 66.2 kg (146 lb), last menstrual period 01/29/2024.    Physical Exam  Constitutional:       General: She is not in acute distress.  HENT:      Head: Normocephalic and atraumatic.      Nose: Nose normal.      Mouth/Throat:      Mouth: Mucous membranes are moist.      Pharynx: Oropharynx is clear. No oropharyngeal exudate or posterior oropharyngeal erythema.   Eyes:      General: Lids are normal.      Extraocular Movements: Extraocular movements intact.      Pupils: Pupils are equal, round, and reactive to light.   Cardiovascular:      Rate and Rhythm: Normal rate.      Pulses: Normal pulses.   Pulmonary:      Effort: Pulmonary effort is normal. No respiratory distress.   Musculoskeletal:         General: Normal range of motion.      Cervical back: Normal range of motion.   Skin:     General: Skin is warm and dry.   Neurological:      Mental Status: She is alert.      Motor: Motor strength is normal.     Deep Tendon Reflexes:      Reflex Scores:       Tricep reflexes are 2+ on the right side and 2+ on the left side.       Bicep reflexes are 2+ on the right side and 2+  on the left side.       Brachioradialis reflexes are 2+ on the right side and 2+ on the left side.       Patellar reflexes are 2+ on the right side and 2+ on the left side.       Achilles reflexes are 2+ on the right side and 2+ on the left side.  Psychiatric:         Mood and Affect: Mood normal.         Speech: Speech normal.         Neurological Exam  Mental Status  Alert. Oriented to person, place and time. Speech is normal. Language is fluent with no aphasia.    Cranial Nerves  CN II: Visual acuity is normal. Visual fields full to confrontation. Right funduscopic exam: not visualized. Left funduscopic exam: not visualized.  CN III, IV, VI: Extraocular movements intact bilaterally. Normal lids and orbits bilaterally. Pupils equal round and reactive to light bilaterally.  CN V: Facial sensation is normal.  CN VII: Full and symmetric facial movement.  CN VIII: Hearing is normal.  CN IX, X: Palate elevates symmetrically  CN XI: Shoulder shrug strength is normal.  CN XII: Tongue midline without atrophy or fasciculations.    Motor  Normal muscle bulk throughout. No fasciculations present. Normal muscle tone. No abnormal involuntary movements. Strength is 5/5 throughout all four extremities.    Sensory  Sensation is intact to light touch, pinprick, vibration and proprioception in all four extremities.    Reflexes                                            Right                      Left  Brachioradialis                    2+                         2+  Biceps                                 2+                         2+  Triceps                                2+                         2+  Patellar                                2+                         2+  Achilles                                2+                         2+    Coordination  Right: Finger-to-nose normal.Left: Finger-to-nose normal.    Gait  Casual gait is normal including stance, stride, and arm swing.        ROS:    Review of Systems    Constitutional:  Negative for chills and fever.   HENT:  Negative for ear pain and sore throat.    Eyes:  Positive for photophobia and visual disturbance. Negative for pain.   Respiratory:  Negative for cough and shortness of breath.    Cardiovascular:  Negative for chest pain and palpitations.   Gastrointestinal:  Negative for abdominal pain and vomiting.   Genitourinary:  Negative for dysuria and hematuria.   Musculoskeletal:  Negative for arthralgias and back pain.   Skin:  Negative for color change and rash.   Neurological:  Positive for headaches. Negative for seizures, syncope, speech difficulty and weakness.   All other systems reviewed and are negative.

## 2024-03-07 ENCOUNTER — CONSULT (OUTPATIENT)
Dept: NEUROLOGY | Facility: CLINIC | Age: 39
End: 2024-03-07
Payer: COMMERCIAL

## 2024-03-07 VITALS
WEIGHT: 146 LBS | SYSTOLIC BLOOD PRESSURE: 96 MMHG | DIASTOLIC BLOOD PRESSURE: 60 MMHG | HEART RATE: 76 BPM | BODY MASS INDEX: 26.7 KG/M2

## 2024-03-07 DIAGNOSIS — R51.9 PRESSURE IN HEAD: Primary | ICD-10-CM

## 2024-03-07 DIAGNOSIS — R51.9 INTRACTABLE EPISODIC HEADACHE, UNSPECIFIED HEADACHE TYPE: ICD-10-CM

## 2024-03-07 DIAGNOSIS — H53.8 BLURRY VISION, BILATERAL: ICD-10-CM

## 2024-03-07 PROCEDURE — 99204 OFFICE O/P NEW MOD 45 MIN: CPT | Performed by: STUDENT IN AN ORGANIZED HEALTH CARE EDUCATION/TRAINING PROGRAM

## 2024-03-07 RX ORDER — DEXAMETHASONE 2 MG/1
2 TABLET ORAL DAILY
Qty: 5 TABLET | Refills: 0 | Status: SHIPPED | OUTPATIENT
Start: 2024-03-07 | End: 2024-03-07

## 2024-03-07 RX ORDER — DEXAMETHASONE 2 MG/1
2 TABLET ORAL DAILY
Qty: 5 TABLET | Refills: 0 | Status: SHIPPED | OUTPATIENT
Start: 2024-03-07 | End: 2024-03-12

## 2024-03-07 NOTE — PATIENT INSTRUCTIONS
Magnesium 400mg daily and Riboflavin (Vitamin B2) 400mg daily    Drink water 60-80 oz per day    Please limit coffee intake to no more than 2 cups    Please take Decadron 2mg daily for 5 days    Follow up in about 3-4 months

## 2024-03-07 NOTE — ASSESSMENT & PLAN NOTE
37 yo F with head pressure, b/l blurry vision, fatigue is here for the initial evaluation of head pressure.    Patient has a combination of migrainous features and tension headache. Will try lifestyle changes for now since patient does not like to take medications     Plan:  -Discussed plan with neurology attending, Dr. Scruggs. Please refer to the attestation for additional recommendation and plan  -Recommend lifestyle changes as below  -Can trial Magnesium 400mg daily and Riboflavin (Vitamin B2) 400mg daily  -Drink water 60-80 oz per day  -limit coffee intake to no more than 2 cups  -take Decadron 2mg daily for 5 days to break the cycle    Follow up in about 3-4 months

## 2024-03-26 ENCOUNTER — OFFICE VISIT (OUTPATIENT)
Dept: OBGYN CLINIC | Facility: CLINIC | Age: 39
End: 2024-03-26
Payer: COMMERCIAL

## 2024-03-26 VITALS
WEIGHT: 144 LBS | SYSTOLIC BLOOD PRESSURE: 118 MMHG | BODY MASS INDEX: 26.5 KG/M2 | HEIGHT: 62 IN | DIASTOLIC BLOOD PRESSURE: 76 MMHG

## 2024-03-26 DIAGNOSIS — Z01.419 ENCOUNTER FOR ANNUAL ROUTINE GYNECOLOGICAL EXAMINATION: Primary | ICD-10-CM

## 2024-03-26 DIAGNOSIS — Z11.3 SCREENING FOR STD (SEXUALLY TRANSMITTED DISEASE): ICD-10-CM

## 2024-03-26 PROCEDURE — S0612 ANNUAL GYNECOLOGICAL EXAMINA: HCPCS | Performed by: STUDENT IN AN ORGANIZED HEALTH CARE EDUCATION/TRAINING PROGRAM

## 2024-03-26 PROCEDURE — 87661 TRICHOMONAS VAGINALIS AMPLIF: CPT | Performed by: STUDENT IN AN ORGANIZED HEALTH CARE EDUCATION/TRAINING PROGRAM

## 2024-03-26 PROCEDURE — G0145 SCR C/V CYTO,THINLAYER,RESCR: HCPCS | Performed by: STUDENT IN AN ORGANIZED HEALTH CARE EDUCATION/TRAINING PROGRAM

## 2024-03-26 PROCEDURE — G0476 HPV COMBO ASSAY CA SCREEN: HCPCS | Performed by: STUDENT IN AN ORGANIZED HEALTH CARE EDUCATION/TRAINING PROGRAM

## 2024-03-26 NOTE — PROGRESS NOTES
[facility]   Annual Well Woman Exam  Ana Okeefe MD  24      ASSESSMENT & PLAN: Alannah Joy is a 38 y.o.  with gynecologic exam significant for irregular menses and uterine tenderness on exam.    1.  Routine well woman exam done today.  2.  Cervical cancer screening: Last Pap: 10/2021- NILM, HPV neg. Pap and HPV: Pap with HPV was done today.  Current ASCCP Guidelines reviewed.  3.  Breast cancer screening: Patient is scheduled for mammogram- Recommend yearly mammography per ACOG guidelines.   4.  STD screening: The patient accepted STD testing.  trichomonas testing performed- recent negative GC/CT. Safe sex practices have been discussed.  5.  Family planning: Complete- s/p tubal ligation  6.  Irregular menses- intermenstrual and spotting s/p intercourse- recent negative GC/CT, trichomonas ordered today- recent genital culture positive for BV treated.  Recent normal pelvic ultrasound  Pap smear completed today to rule out cervical dysplasia.  Reviewed recommendations for endometrial biopsy versus empiric treatment for chronic endometritis given uterine tenderness and irregular bleeding on exam.   Patient would prefer to have EMB completed to r/o hyperplasia, malignancy and to look for any signs of chronic endometritis before initiating any treatment- will return to office and counseled.  7. The following were reviewed in today's visit: breast self exam, STD testing, exercise, and healthy diet.  7.  Patient to return to office for EMB in the upcoming month and in 12 months for annual exam or sooner if needed.     All questions have been answered to her satisfaction.    Subjective:    CC:  Annual Gynecologic Examination    HPI: Alannah Joy is a 38 y.o.  who presents for annual gynecologic examination.  She has the following concerns:    Irregular menses  Bleeding after intercourse  Dyspareunia    GYN  Complaints: irregular and heavy menses  Denies dysmenorrhea, genital  discharge, genital ulcers, and vulvar/vaginal symptoms  Menstrual cycles are irregular and heavy  Since tubal ligation  Twice in begging and end of month  She does not report hot flashes, night sweats, other symptoms of menopause.  Sexually active: 1 partner- having bleeding and pain with intercourse  Birth control: BTL  Hx STI: more than 20 years ago  Hx of abnormal Pap: denies   Last Pap :  NILM, HPV neg.    OB     Pregnancy complications: denies  Family planning:BTL    G/U / G/I  Complaints: denies  Denies urinary frequency, hematuria, urinary hesitancy, urinary retention, urinary incontinence, and dysuria    Breast  Complaints: none  Denies: breast lump, breast tenderness, changed mole, dryness, nipple discharge, pruritus, rash, skin color change, and skin lesion(s)  Last mammogram: has not yet had baseline  Family hx: denies fhx of breast, uterine, ovarian, or colon cancers  Paternal Aunt Breast cancer  She does practice breast self awareness.      Health Maintenance:    She does follow with a PCP.  She exercises 2 days per week.  She does follow a well balanced diet.    Occupation: working clothing store  She feels safe at home and domestic violence.    Past Medical History:   Diagnosis Date    Abnormal Pap smear of cervix     Kidney stones        Past Surgical History:   Procedure Laterality Date    TUBAL LIGATION         Past OB/Gyn History:     Patient's last menstrual period was 2024.    Family History:  Family History   Problem Relation Age of Onset    Breast cancer Paternal Aunt     Rashes / Skin problems Son     Colon cancer Neg Hx     Ovarian cancer Neg Hx     Diabetes Neg Hx        Social History:  Social History     Socioeconomic History    Marital status: /Civil Union     Spouse name: Not on file    Number of children: 4    Years of education: Not on file    Highest education level: Not on file   Occupational History    Not on file   Tobacco Use    Smoking status: Never     Smokeless tobacco: Never   Vaping Use    Vaping status: Never Used   Substance and Sexual Activity    Alcohol use: Not Currently     Comment: ocassionally    Drug use: Never    Sexual activity: Not Currently     Partners: Male     Birth control/protection: Female Sterilization   Other Topics Concern    Not on file   Social History Narrative    Not on file     Social Determinants of Health     Financial Resource Strain: Low Risk  (10/5/2021)    Overall Financial Resource Strain (CARDIA)     Difficulty of Paying Living Expenses: Not hard at all   Food Insecurity: No Food Insecurity (10/5/2021)    Hunger Vital Sign     Worried About Running Out of Food in the Last Year: Never true     Ran Out of Food in the Last Year: Never true   Transportation Needs: No Transportation Needs (10/5/2021)    PRAPARE - Transportation     Lack of Transportation (Medical): No     Lack of Transportation (Non-Medical): No   Physical Activity: Not on file   Stress: No Stress Concern Present (10/5/2021)    [employer] [place] [employer] - Occupational Stress Questionnaire     Feeling of Stress : Not at all   Social Connections: Not on file   Intimate Partner Violence: Not At Risk (10/5/2021)    Humiliation, Afraid, Rape, and Kick questionnaire     Fear of Current or Ex-Partner: No     Emotionally Abused: No     Physically Abused: No     Sexually Abused: No   Housing Stability: Low Risk  (10/5/2021)    Housing Stability Vital Sign     Unable to Pay for Housing in the Last Year: No     Number of Places Lived in the Last Year: 1     Unstable Housing in the Last Year: No     No Known Allergies    Current Outpatient Medications:     ferrous sulfate 324 (65 Fe) mg, Take 1 tablet (324 mg total) by mouth 2 (two) times a day before meals, Disp: 120 tablet, Rfl: 0    buPROPion (WELLBUTRIN XL) 150 mg 24 hr tablet, Take 1 tablet (150 mg total) by mouth every morning (Patient not taking: Reported on 3/7/2024), Disp: 30 tablet, Rfl: 5     "ergocalciferol (VITAMIN D2) 50,000 units, Take 1 capsule (50,000 Units total) by mouth once a week for 8 doses, Disp: 8 capsule, Rfl: 0    polyethylene glycol (MIRALAX) 17 g packet, Take 17 g by mouth daily (Patient not taking: Reported on 2/9/2024), Disp: 20 each, Rfl: 0    Review of Systems:  Denies fevers, chills, unintentional weight loss, excessive fatigue, chest pain, shortness of breath, abdominal pain, nausea, vomiting, urinary incontinence, urinary frequency, vaginal bleeding, vaginal discharge. All other systems negative unless otherwise stated.     Physical Exam:  /76 (BP Location: Left arm, Patient Position: Sitting, Cuff Size: Standard)   Ht 5' 2\" (1.575 m)   Wt 65.3 kg (144 lb)   LMP 02/28/2024   BMI 26.34 kg/m²  Body mass index is 26.34 kg/m².   GEN: The patient was alert and oriented x3, pleasant well-appearing female in no acute distress.   HEENT:  Unremarkable, no anterior or posterior lymphadenopathy, no thyromegaly  CV:  Regular rate and rhythm, normal S1 and S2, no murmurs  RESP:  Clear to auscultation bilaterally, no wheezes, rales or rhonchi  BREAST:  Symmetric breasts with no palpable breast masses or obvious breast lesions. She has no retractions or nipple discharge. She has no axillary abnormalities or palpable masses.   GI:  Soft, nontender, non-distended  MSK: bilateral lower extremities are nontender, no edema  : Normal appearing external female genitalia, normal appearing urethral meatus. On sterile speculum exam, normal appearing vaginal epithelium, no vaginal discharge, no bleeding, grossly normal appearing cervix. On bimanual exam, no cervical motion tenderness; uterus is smooth, mobile with tenderness to palpation. No tenderness or fullness in the bilateral adnexa.     "

## 2024-03-27 DIAGNOSIS — R53.83 OTHER FATIGUE: ICD-10-CM

## 2024-03-27 LAB
HPV HR 12 DNA CVX QL NAA+PROBE: NEGATIVE
HPV16 DNA CVX QL NAA+PROBE: POSITIVE
HPV18 DNA CVX QL NAA+PROBE: NEGATIVE

## 2024-03-27 RX ORDER — ERGOCALCIFEROL 1.25 MG/1
50000 CAPSULE ORAL WEEKLY
Qty: 8 CAPSULE | Refills: 0 | Status: SHIPPED | OUTPATIENT
Start: 2024-03-27

## 2024-03-28 LAB — T VAGINALIS DNA SPEC QL NAA+PROBE: NEGATIVE

## 2024-03-29 ENCOUNTER — HOSPITAL ENCOUNTER (OUTPATIENT)
Facility: HOSPITAL | Age: 39
End: 2024-03-29
Payer: COMMERCIAL

## 2024-03-29 VITALS — BODY MASS INDEX: 26.5 KG/M2 | WEIGHT: 144 LBS | HEIGHT: 62 IN

## 2024-03-29 DIAGNOSIS — Z12.31 ENCOUNTER FOR SCREENING MAMMOGRAM FOR MALIGNANT NEOPLASM OF BREAST: ICD-10-CM

## 2024-03-29 PROCEDURE — 77063 BREAST TOMOSYNTHESIS BI: CPT

## 2024-03-29 PROCEDURE — 77067 SCR MAMMO BI INCL CAD: CPT

## 2024-04-02 LAB
LAB AP GYN PRIMARY INTERPRETATION: NORMAL
Lab: NORMAL

## 2024-05-30 ENCOUNTER — TELEPHONE (OUTPATIENT)
Dept: NEUROLOGY | Facility: CLINIC | Age: 39
End: 2024-05-30

## 2024-05-30 NOTE — TELEPHONE ENCOUNTER
Left voicemail for patient to call back or on my chart and confirm appointment with Dr. Lunsford 6/4 130 pm at [facility].

## 2024-05-31 NOTE — TELEPHONE ENCOUNTER
2nd attempt, left voicemail for patient to call back or on my chart confirm appointment with Dr. Lunsford 6/4 130 pm at [facility].

## 2024-06-04 ENCOUNTER — OFFICE VISIT (OUTPATIENT)
Dept: NEUROLOGY | Facility: CLINIC | Age: 39
End: 2024-06-04
Payer: COMMERCIAL

## 2024-06-04 VITALS
DIASTOLIC BLOOD PRESSURE: 80 MMHG | HEART RATE: 77 BPM | BODY MASS INDEX: 26.68 KG/M2 | WEIGHT: 145 LBS | HEIGHT: 62 IN | SYSTOLIC BLOOD PRESSURE: 122 MMHG

## 2024-06-04 DIAGNOSIS — H53.8 BLURRY VISION, BILATERAL: ICD-10-CM

## 2024-06-04 DIAGNOSIS — R51.9 INTRACTABLE EPISODIC HEADACHE, UNSPECIFIED HEADACHE TYPE: Primary | ICD-10-CM

## 2024-06-04 PROCEDURE — 99214 OFFICE O/P EST MOD 30 MIN: CPT | Performed by: PSYCHIATRY & NEUROLOGY

## 2024-06-04 NOTE — ASSESSMENT & PLAN NOTE
37 yo F with migrainous features and tension headache is here for follow up. Last follow up on 3/7/2024.     Due to intermittent pressure headache associated with blurry vision b/l, concerning for IIH.     Plan:  -Discussed plan with neurology attending, Dr. Ocasio. Please refer to the attestation for additional recommendation and plan  -Recommend taking Magnesium oxide 400mg daily and B2 400mg daily  -Continue drinking water 60-80oz, eat meals consistently and limit coffee intake to 2 cuts  -Referral to Ophthalmology    Follow up in about 4 months

## 2024-06-04 NOTE — PROGRESS NOTES
Patient ID: Alannah Joy is a 38 y.o. female.    Assessment/Plan:    Intractable episodic headache  39 yo F with migrainous features and tension headache is here for follow up. Last follow up on 3/7/2024.     Due to intermittent pressure headache associated with blurry vision b/l, concerning for IIH.     Plan:  -Discussed plan with neurology attending, Dr. Ocasio. Please refer to the attestation for additional recommendation and plan  -Recommend taking Magnesium oxide 400mg daily and B2 400mg daily  -Continue drinking water 60-80oz, eat meals consistently and limit coffee intake to 2 cuts  -Referral to Ophthalmology    Follow up in about 4 months       Diagnoses and all orders for this visit:    Intractable episodic headache, unspecified headache type  -     Ambulatory Referral to Ophthalmology; Future  -     MRI brain without contrast; Future    Blurry vision, bilateral  -     Ambulatory Referral to Ophthalmology; Future  -     MRI brain without contrast; Future           Subjective:    39 yo F with migrainous features and tension headache is here for follow up. Last follow up on 3/7/2024.     Since last visit, she is taking lots of water. She still drinks 2-3 cups of coffee sometimes. She feels that her headache frequency is 2-3 times per week now. Same characteristics of head pressure in the central head, b/l blurry vision and fatigue. She has not had a chance to take Magnesium and B2 yet.    Brief hx:   Head pressure started in Jan 2023 (after drinking a whole bottle of liquor on new year dayana). She has some associated symptoms of b/l blurry vision, nausea, photophobia, dizziness. Baseline head pressure is 1-2/10, when it is bad, it is 8-9/10. No balance issue/falls. No neck pain. No aura.  She remembers that when she was young, she had headache with exposure to sunlight and described as 'big bang' not the same as current one.     She drinks 24 oz of water and 3 cups of coffee a day  She has 8 hrs of sleep per  "night     1/3/2023 CTH: unremarkable     The following portions of the patient's history were reviewed and updated as appropriate: allergies, current medications, past family history, past medical history, past social history, past surgical history, and problem list.       Objective:    Blood pressure 122/80, pulse 77, height 5' 2\" (1.575 m), weight 65.8 kg (145 lb).    Physical Exam  Constitutional:       General: She is not in acute distress.  HENT:      Head: Normocephalic and atraumatic.      Nose: Nose normal.      Mouth/Throat:      Mouth: Mucous membranes are moist.      Pharynx: Oropharynx is clear. No oropharyngeal exudate or posterior oropharyngeal erythema.   Eyes:      General: Lids are normal.      Extraocular Movements: Extraocular movements intact.      Pupils: Pupils are equal, round, and reactive to light.   Cardiovascular:      Rate and Rhythm: Normal rate.      Pulses: Normal pulses.   Pulmonary:      Effort: Pulmonary effort is normal.   Musculoskeletal:         General: Normal range of motion.      Cervical back: Normal range of motion.   Skin:     General: Skin is warm and dry.   Neurological:      Mental Status: She is alert.      Motor: Motor strength is normal.     Deep Tendon Reflexes:      Reflex Scores:       Tricep reflexes are 2+ on the right side and 2+ on the left side.       Bicep reflexes are 2+ on the right side and 2+ on the left side.       Brachioradialis reflexes are 2+ on the right side and 2+ on the left side.       Patellar reflexes are 2+ on the right side and 2+ on the left side.       Achilles reflexes are 2+ on the right side and 2+ on the left side.  Psychiatric:         Mood and Affect: Mood normal.         Speech: Speech normal.         Neurological Exam  Mental Status  Alert. Oriented to person, place and time. Speech is normal. Language is fluent with no aphasia.    Cranial Nerves  CN II: Visual acuity is normal. Visual fields full to confrontation. Right " funduscopic exam: not visualized. Left funduscopic exam: not visualized.  CN III, IV, VI: Extraocular movements intact bilaterally. Normal lids and orbits bilaterally. Pupils equal round and reactive to light bilaterally.  CN V: Facial sensation is normal.  CN VII: Full and symmetric facial movement.  CN VIII: Hearing is normal.  CN IX, X: Palate elevates symmetrically  CN XI: Shoulder shrug strength is normal.  CN XII: Tongue midline without atrophy or fasciculations.    Motor  Normal muscle bulk throughout. No fasciculations present. Normal muscle tone. No abnormal involuntary movements. Strength is 5/5 throughout all four extremities.    Sensory  Light touch is normal in upper and lower extremities.     Reflexes                                            Right                      Left  Brachioradialis                    2+                         2+  Biceps                                 2+                         2+  Triceps                                2+                         2+  Patellar                                2+                         2+  Achilles                                2+                         2+    Coordination  Right: Finger-to-nose normal.Left: Finger-to-nose normal.    Gait  Casual gait is normal including stance, stride, and arm swing.        ROS:    Review of Systems   Constitutional:  Negative for chills and fever.   HENT:  Negative for ear pain and sore throat.    Eyes:  Negative for pain and visual disturbance.   Respiratory:  Negative for cough and shortness of breath.    Cardiovascular:  Negative for chest pain and palpitations.   Gastrointestinal:  Negative for abdominal pain and vomiting.   Genitourinary:  Negative for dysuria and hematuria.   Musculoskeletal:  Negative for arthralgias and back pain.   Skin:  Negative for color change and rash.   Neurological:  Positive for headaches. Negative for seizures, syncope and weakness.   All other systems reviewed and are  negative.

## 2024-06-04 NOTE — PATIENT INSTRUCTIONS
Please get at [facility]:    1. Magnesium oxide 400mg daily     2. Riboflavin (Vitamin B2) 400mg daily     Follow up in 4 months

## 2024-09-30 ENCOUNTER — TELEPHONE (OUTPATIENT)
Dept: NEUROLOGY | Facility: CLINIC | Age: 39
End: 2024-09-30

## 2024-09-30 NOTE — TELEPHONE ENCOUNTER
Left voicemail for patient in regards to a MRI order. Order was placed during patient last visit. Reminded patient MRI was not yet placed, reminded patient to call to schedule MRI for next office visit. Left call back number for any questions or concerns.

## 2024-10-14 ENCOUNTER — PROCEDURE VISIT (OUTPATIENT)
Dept: OBGYN CLINIC | Facility: CLINIC | Age: 39
End: 2024-10-14
Payer: COMMERCIAL

## 2024-10-14 VITALS
BODY MASS INDEX: 27.23 KG/M2 | DIASTOLIC BLOOD PRESSURE: 78 MMHG | SYSTOLIC BLOOD PRESSURE: 116 MMHG | HEIGHT: 62 IN | WEIGHT: 148 LBS

## 2024-10-14 DIAGNOSIS — Z32.02 NEGATIVE PREGNANCY TEST: ICD-10-CM

## 2024-10-14 DIAGNOSIS — R87.810 HUMAN PAPILLOMAVIRUS (HPV) TYPE 16 DNA DETECTED IN CERVICAL SPECIMEN: Primary | ICD-10-CM

## 2024-10-14 LAB — SL AMB POCT URINE HCG: NEGATIVE

## 2024-10-14 PROCEDURE — 88305 TISSUE EXAM BY PATHOLOGIST: CPT | Performed by: PATHOLOGY

## 2024-10-14 PROCEDURE — 81025 URINE PREGNANCY TEST: CPT | Performed by: STUDENT IN AN ORGANIZED HEALTH CARE EDUCATION/TRAINING PROGRAM

## 2024-10-14 PROCEDURE — 57456 ENDOCERV CURETTAGE W/SCOPE: CPT | Performed by: STUDENT IN AN ORGANIZED HEALTH CARE EDUCATION/TRAINING PROGRAM

## 2024-10-14 NOTE — PROGRESS NOTES
"COLPOSCOPY PROCEDURE NOTE  Alannah Joy  [id number]  10/14/2024  12:31 PM    Subjective   Alannah Joy is a 39 y.o.  with positive HPV 16 testing and normal pap results presenting for colposcopy.   She is currently without complaint.   She denies fever/chills, abdominal pain, cramping, vaginal discharge or irritation.    Pap History:  3/2023- HPV 16 pos, pap NILM  10/2021- NILM, HPV neg  Does report a history of abnormal pap and positive HPV testing before her children     The following portions of the patient's history were reviewed and updated as appropriate: allergies, current medications, past family history, past medical history, past social history, past surgical history, and problem list.      Objective  /78 (BP Location: Left arm, Patient Position: Sitting, Cuff Size: Standard)   Ht 5' 2\" (1.575 m)   Wt 67.1 kg (148 lb)   LMP 2024 (Exact Date)   BMI 27.07 kg/m²      Physical Exam:  Physical Exam  Vitals reviewed.   Constitutional:       Appearance: Normal appearance. She is not ill-appearing or diaphoretic.   HENT:      Head: Normocephalic and atraumatic.   Cardiovascular:      Rate and Rhythm: Normal rate.   Pulmonary:      Effort: Pulmonary effort is normal. No respiratory distress.   Genitourinary:     Comments: Vulva appears normal without lesions, urethra midline without prolapse.  On speculum exam the vagina and cervix appear grossly normal, minimal bleeding noted at the cervical os.  No discharge noted.  Following acetic acid placement there were no concerning acetowhite changes- ECC was performed.   Musculoskeletal:      Right lower leg: No edema.      Left lower leg: No edema.   Neurological:      Mental Status: She is alert and oriented to person, place, and time.   Psychiatric:         Mood and Affect: Mood normal.         Behavior: Behavior normal.         Colposcopy    Date/Time: 10/14/2024 12:30 PM    Performed by: Ana Okeefe MD  Authorized by: Ana FIGUEROA" MD Sary    Verbal consent obtained?: Yes    Risks and benefits: Risks, benefits and alternatives were discussed    Consent given by:  Patient  Pre-procedure:     Prepped with: acetic acid    Indication:     Indications: HPV 16 positive.  Procedure:     Procedure: Colposcopy w/ endocervical curettage      Under satisfactory analgesia the patient was prepped and draped in the dorsal lithotomy position: yes      Stowe speculum was placed in the vagina: yes      Under colposcopic examination the transition zone was seen in entirety: yes      Endocervix was curetted using a Kevorkian curette: yes      Monsel's solution was applied: yes      Specimen(s) to pathology: yes (ECC performed)    Post-procedure:     Impression: Low grade cervical dysplasia      Patient tolerance of procedure:  Tolerated well, no immediate complications      Assessment/ Plan:  39 y.o.  presenting with NILM, HPV 16 positive pap.   Colposcopy performed, impression: overall normal to low grade  ECC performed today- will call with results  Reviewed HPV and need for adherence to follow up with cervical cancer screening and well woman care.    Ana Okeefe MD  10/14/24

## 2024-10-14 NOTE — PATIENT INSTRUCTIONS
Patient Education     Colposcopy   Why is this procedure done?   This is a procedure that your doctor uses if there are problems with your cervix. The doctor looks at your cervix using a special tool called a colposcope. This tool has a light and also makes things look bigger. This helps your doctor find abnormal cells on the cervix. Then, the doctor can take a tissue sample called a biopsy of the abnormal cells. The doctor most often suggests this test when a Pap smear is abnormal.  What will the results be?   Your doctor will talk with you about what was seen during the exam. The biopsy test results will let your doctor know what kind of changes there are in the cells and if you need more treatment.  What happens before the procedure?   Schedule this test before or after your menstrual period.  For 24 hours before the procedure do not:  Use vaginal creams or douches  Use tampons  Have sex  Your doctor will ask you about your health history. Talk to the doctor about:  All the drugs you are taking. Be sure to include all prescription and over-the-counter (OTC) drugs, and herbal supplements. Tell the doctor about any drug allergy. Bring a list of drugs you take with you. Ask about what drugs you should or should not take.  Any bleeding problems. Be sure to tell your doctor if you are taking any drugs that may cause bleeding. Some of these are warfarin, rivaroxaban, apixaban, ticagrelor, clopidogrel, ketorolac, ibuprofen, naproxen, or aspirin. Certain vitamins and herbs, such as garlic and fish oil, may also add to the risk for bleeding. You may need to stop these drugs as well. Talk to your doctor about them.  If you are pregnant or think you might be.  Empty your bladder before the exam. This may help you be comfortable during the test.  What happens during the procedure?   You will lie on an exam table and put your feet in foot holders.  Your doctor will put a special tool called a speculum into your vagina. This  helps keep your vagina open during the exam.  The doctor will position the colposcope between your legs to look closely at the cervix and vagina.  The doctor will apply a solution with a cotton ball or swab. It will show any abnormal cells.  The doctor may take tissue samples if there are abnormal cells seen. This is a biopsy. Your doctor will use a wire loop and scoop out the abnormal cells inside your cervix. You may feel some discomfort while your doctor scoops out the abnormal cells. Your doctor may apply a special paste on the site to help stop the bleeding.  Sometimes the doctor is able to remove the abnormal cells. Other times the doctor may be able to use heat or cold to get rid of the abnormal cells. If you have a large area of cells that are not normal, you may need to have treatment at another time.  The doctor will take all the tools out and will send the sample to the lab for testing.  The exam may take 10 to 15 minutes. It may take another 5 to 10 minutes to treat any abnormal cells.  What happens after the procedure?   If you had a biopsy, ask your doctor when you will get the results.  You may go home after the procedure.  What care is needed at home?   Ask your doctor what you need to do when you go home. Make sure you ask questions if you do not understand what the doctor says. This way you will know what you need to do.  If you had a biopsy, you may be sore for few days. Your doctor will tell you what drugs to take to ease the pain.  You may have bleeding and dark vaginal discharge for few days. Use sanitary pads.  After the exam, ask your doctor when it is safe to:  Start using tampons or a menstrual cup again  Bathe or douche  Go back to your regular activities like work, driving, and sex.  What follow-up care is needed?   Your doctor may ask you to make visits to the office to check on your progress. Be sure to keep these visits.  The results will help your doctor know if anything is wrong.  Together you can make a plan for more care if it is needed.  What problems could happen?   Pain  Bleeding  Infection  More likely to have a baby born early if you have repeated treatments or need to have a lot of tissue removed  When do I need to call the doctor?   Signs of infection. These include fever of 100.4°F (38°C) or higher, chills.  Very heavy bleeding  Very bad belly pain  Secretions from the vagina that smell bad  Last Reviewed Date   2021-06-07  Consumer Information Use and Disclaimer   This generalized information is a limited summary of diagnosis, treatment, and/or medication information. It is not meant to be comprehensive and should be used as a tool to help the user understand and/or assess potential diagnostic and treatment options. It does NOT include all information about conditions, treatments, medications, side effects, or risks that may apply to a specific patient. It is not intended to be medical advice or a substitute for the medical advice, diagnosis, or treatment of a health care provider based on the health care provider's examination and assessment of a patient’s specific and unique circumstances. Patients must speak with a health care provider for complete information about their health, medical questions, and treatment options, including any risks or benefits regarding use of medications. This information does not endorse any treatments or medications as safe, effective, or approved for treating a specific patient. Up[employer], Inc[employer] and its affiliates disclaim any warranty or liability relating to this information or the use thereof. The use of this information is governed by the Terms of Use, available at https://www.[employer].com/en/know/clinical-effectiveness-terms   Copyright   Copyright © 2024 [employer], Inc[employer] and its affiliates and/or licensors. All rights reserved.

## 2024-10-18 PROCEDURE — 88305 TISSUE EXAM BY PATHOLOGIST: CPT | Performed by: PATHOLOGY

## 2025-08-02 ENCOUNTER — HOSPITAL ENCOUNTER (EMERGENCY)
Facility: HOSPITAL | Age: 40
Discharge: HOME | End: 2025-08-02
Attending: EMERGENCY MEDICINE | Admitting: EMERGENCY MEDICINE
Payer: COMMERCIAL

## 2025-08-04 ENCOUNTER — TELEPHONE (OUTPATIENT)
Age: 40
End: 2025-08-04

## 2025-08-05 ENCOUNTER — CONSULT (OUTPATIENT)
Dept: DERMATOLOGY | Facility: CLINIC | Age: 40
End: 2025-08-05

## 2025-08-05 VITALS — BODY MASS INDEX: 27.62 KG/M2 | WEIGHT: 151 LBS | TEMPERATURE: 97 F

## 2025-08-05 DIAGNOSIS — D48.5 NEOPLASM OF UNCERTAIN BEHAVIOR OF SKIN: Primary | ICD-10-CM

## 2025-08-05 PROCEDURE — 88341 IMHCHEM/IMCYTCHM EA ADD ANTB: CPT | Performed by: STUDENT IN AN ORGANIZED HEALTH CARE EDUCATION/TRAINING PROGRAM

## 2025-08-05 PROCEDURE — 88342 IMHCHEM/IMCYTCHM 1ST ANTB: CPT | Performed by: STUDENT IN AN ORGANIZED HEALTH CARE EDUCATION/TRAINING PROGRAM

## 2025-08-05 PROCEDURE — 88305 TISSUE EXAM BY PATHOLOGIST: CPT | Performed by: STUDENT IN AN ORGANIZED HEALTH CARE EDUCATION/TRAINING PROGRAM

## 2025-08-12 ENCOUNTER — RESULTS FOLLOW-UP (OUTPATIENT)
Dept: DERMATOLOGY | Facility: CLINIC | Age: 40
End: 2025-08-12

## 2025-08-13 ENCOUNTER — TELEPHONE (OUTPATIENT)
Dept: DERMATOLOGY | Facility: CLINIC | Age: 40
End: 2025-08-13